# Patient Record
Sex: FEMALE | Race: WHITE | Employment: OTHER | URBAN - METROPOLITAN AREA
[De-identification: names, ages, dates, MRNs, and addresses within clinical notes are randomized per-mention and may not be internally consistent; named-entity substitution may affect disease eponyms.]

---

## 2015-07-01 LAB — PAP SMEAR, EXTERNAL: NORMAL

## 2017-09-21 LAB
COLONOSCOPY, EXTERNAL: NORMAL
MAMMOGRAPHY, EXTERNAL: NORMAL

## 2018-10-11 LAB — AMB DEXA, EXTERNAL: NORMAL

## 2020-05-28 LAB — CREATININE, EXTERNAL: 0.82

## 2020-10-19 VITALS
WEIGHT: 129 LBS | RESPIRATION RATE: 12 BRPM | SYSTOLIC BLOOD PRESSURE: 146 MMHG | OXYGEN SATURATION: 97 % | TEMPERATURE: 98.3 F | BODY MASS INDEX: 24.35 KG/M2 | DIASTOLIC BLOOD PRESSURE: 68 MMHG | HEART RATE: 73 BPM | HEIGHT: 61 IN

## 2020-10-19 VITALS
BODY MASS INDEX: 24.35 KG/M2 | HEART RATE: 73 BPM | SYSTOLIC BLOOD PRESSURE: 146 MMHG | DIASTOLIC BLOOD PRESSURE: 68 MMHG | WEIGHT: 129 LBS | HEIGHT: 61 IN | OXYGEN SATURATION: 97 %

## 2020-10-19 RX ORDER — REPAGLINIDE 1 MG/1
TABLET ORAL
COMMUNITY
Start: 2020-09-30 | End: 2021-04-28 | Stop reason: ALTCHOICE

## 2020-10-19 RX ORDER — TACROLIMUS 1 MG/G
OINTMENT TOPICAL
COMMUNITY
Start: 2020-07-16 | End: 2021-01-07

## 2020-10-19 RX ORDER — METFORMIN HYDROCHLORIDE 500 MG/1
500 TABLET ORAL DAILY
COMMUNITY
Start: 2006-10-04

## 2020-10-19 RX ORDER — CALCIUM CARBONATE/VITAMIN D3 500-10/5ML
400 LIQUID (ML) ORAL
COMMUNITY
Start: 2016-04-06

## 2020-10-19 RX ORDER — ROSUVASTATIN CALCIUM 5 MG/1
TABLET, COATED ORAL
COMMUNITY
Start: 2020-07-24 | End: 2022-01-11

## 2020-10-19 RX ORDER — LORATADINE 10 MG/1
10 TABLET ORAL DAILY
COMMUNITY
Start: 2020-07-15

## 2020-10-19 RX ORDER — PEN NEEDLE, DIABETIC 32GX 5/32"
NEEDLE, DISPOSABLE MISCELLANEOUS
COMMUNITY
Start: 2020-09-09

## 2020-10-19 RX ORDER — CALCIUM CARBONATE 750 MG/1
TABLET, CHEWABLE ORAL
COMMUNITY
Start: 2016-04-13 | End: 2021-01-07

## 2020-10-19 RX ORDER — ASCORBIC ACID 125 MG
TABLET,CHEWABLE ORAL
COMMUNITY
Start: 2016-04-06 | End: 2022-01-11

## 2020-10-19 RX ORDER — LEVOTHYROXINE SODIUM 75 UG/1
75 TABLET ORAL DAILY
COMMUNITY
Start: 2016-04-06

## 2020-10-19 RX ORDER — YEAST,DRIED (S. CEREVISIAE)
POWDER (GRAM) ORAL
COMMUNITY
End: 2022-01-11

## 2020-10-19 RX ORDER — INSULIN GLARGINE 300 U/ML
INJECTION, SOLUTION SUBCUTANEOUS
COMMUNITY

## 2020-10-19 RX ORDER — LIDOCAINE AND PRILOCAINE 25; 25 MG/G; MG/G
CREAM TOPICAL
COMMUNITY
Start: 2020-10-16

## 2020-11-12 VITALS
SYSTOLIC BLOOD PRESSURE: 146 MMHG | DIASTOLIC BLOOD PRESSURE: 68 MMHG | OXYGEN SATURATION: 97 % | RESPIRATION RATE: 12 BRPM | WEIGHT: 129.25 LBS | HEIGHT: 61 IN | HEART RATE: 73 BPM | TEMPERATURE: 98.3 F | BODY MASS INDEX: 24.4 KG/M2

## 2020-11-12 PROBLEM — M85.80 OSTEOPENIA: Status: ACTIVE | Noted: 2020-11-12

## 2020-11-12 PROBLEM — I10 HYPERTENSIVE DISORDER: Status: ACTIVE | Noted: 2020-11-12

## 2020-11-12 RX ORDER — AZELASTINE HCL 205.5 UG/1
SPRAY NASAL 2 TIMES DAILY
COMMUNITY

## 2020-11-12 RX ORDER — BISOPROLOL FUMARATE AND HYDROCHLOROTHIAZIDE 5; 6.25 MG/1; MG/1
1 TABLET ORAL DAILY
COMMUNITY

## 2020-11-12 RX ORDER — GABAPENTIN 300 MG/1
300 CAPSULE ORAL
COMMUNITY

## 2020-11-12 RX ORDER — EXENATIDE 2 MG/.85ML
INJECTION, SUSPENSION, EXTENDED RELEASE SUBCUTANEOUS
COMMUNITY

## 2020-11-12 RX ORDER — DIAZEPAM 5 MG/1
5 TABLET ORAL
COMMUNITY
End: 2021-01-07

## 2020-11-12 RX ORDER — REPAGLINIDE 0.5 MG/1
0.5 TABLET ORAL
COMMUNITY

## 2020-11-12 RX ORDER — ZINC CITRATE/PHYTASE 25MG-500MG
CAPSULE ORAL
COMMUNITY

## 2020-11-12 RX ORDER — BLOOD SUGAR DIAGNOSTIC
STRIP MISCELLANEOUS SEE ADMIN INSTRUCTIONS
COMMUNITY

## 2020-11-12 RX ORDER — AMLODIPINE AND OLMESARTAN MEDOXOMIL 5; 20 MG/1; MG/1
TABLET ORAL
COMMUNITY

## 2020-11-12 RX ORDER — CHOLECALCIFEROL (VITAMIN D3) 125 MCG
CAPSULE ORAL
COMMUNITY

## 2020-11-12 RX ORDER — ERGOCALCIFEROL 1.25 MG/1
50000 CAPSULE ORAL
COMMUNITY

## 2021-01-07 ENCOUNTER — OFFICE VISIT (OUTPATIENT)
Dept: FAMILY MEDICINE CLINIC | Age: 81
End: 2021-01-07
Payer: MEDICARE

## 2021-01-07 VITALS
HEART RATE: 68 BPM | TEMPERATURE: 96.9 F | WEIGHT: 135.38 LBS | HEIGHT: 61 IN | DIASTOLIC BLOOD PRESSURE: 60 MMHG | SYSTOLIC BLOOD PRESSURE: 110 MMHG | OXYGEN SATURATION: 97 % | BODY MASS INDEX: 25.56 KG/M2

## 2021-01-07 DIAGNOSIS — Z79.4 CONTROLLED TYPE 2 DIABETES MELLITUS WITH HYPERGLYCEMIA, WITH LONG-TERM CURRENT USE OF INSULIN (HCC): ICD-10-CM

## 2021-01-07 DIAGNOSIS — Z91.81 AT LOW RISK FOR FALL: ICD-10-CM

## 2021-01-07 DIAGNOSIS — I10 ESSENTIAL HYPERTENSION: ICD-10-CM

## 2021-01-07 DIAGNOSIS — Z00.00 MEDICARE ANNUAL WELLNESS VISIT, SUBSEQUENT: Primary | ICD-10-CM

## 2021-01-07 DIAGNOSIS — Z13.39 ALCOHOL SCREENING: ICD-10-CM

## 2021-01-07 DIAGNOSIS — Z13.31 DEPRESSION SCREENING: ICD-10-CM

## 2021-01-07 DIAGNOSIS — S90.222A TOENAIL BRUISE, LEFT, INITIAL ENCOUNTER: ICD-10-CM

## 2021-01-07 DIAGNOSIS — E66.3 OVERWEIGHT WITH BODY MASS INDEX (BMI) OF 26 TO 26.9 IN ADULT: ICD-10-CM

## 2021-01-07 DIAGNOSIS — M85.80 OSTEOPENIA, UNSPECIFIED LOCATION: ICD-10-CM

## 2021-01-07 DIAGNOSIS — E11.65 CONTROLLED TYPE 2 DIABETES MELLITUS WITH HYPERGLYCEMIA, WITH LONG-TERM CURRENT USE OF INSULIN (HCC): ICD-10-CM

## 2021-01-07 PROCEDURE — G8752 SYS BP LESS 140: HCPCS | Performed by: NURSE PRACTITIONER

## 2021-01-07 PROCEDURE — 99213 OFFICE O/P EST LOW 20 MIN: CPT | Performed by: NURSE PRACTITIONER

## 2021-01-07 PROCEDURE — 1090F PRES/ABSN URINE INCON ASSESS: CPT | Performed by: NURSE PRACTITIONER

## 2021-01-07 PROCEDURE — G8432 DEP SCR NOT DOC, RNG: HCPCS | Performed by: NURSE PRACTITIONER

## 2021-01-07 PROCEDURE — G8419 CALC BMI OUT NRM PARAM NOF/U: HCPCS | Performed by: NURSE PRACTITIONER

## 2021-01-07 PROCEDURE — G0439 PPPS, SUBSEQ VISIT: HCPCS | Performed by: NURSE PRACTITIONER

## 2021-01-07 PROCEDURE — 1101F PT FALLS ASSESS-DOCD LE1/YR: CPT | Performed by: NURSE PRACTITIONER

## 2021-01-07 PROCEDURE — G8536 NO DOC ELDER MAL SCRN: HCPCS | Performed by: NURSE PRACTITIONER

## 2021-01-07 PROCEDURE — G8427 DOCREV CUR MEDS BY ELIG CLIN: HCPCS | Performed by: NURSE PRACTITIONER

## 2021-01-07 PROCEDURE — G8754 DIAS BP LESS 90: HCPCS | Performed by: NURSE PRACTITIONER

## 2021-01-07 NOTE — PATIENT INSTRUCTIONS
Medicare Wellness Visit, Female The best way to live healthy is to have a lifestyle where you eat a well-balanced diet, exercise regularly, limit alcohol use, and quit all forms of tobacco/nicotine, if applicable. Regular preventive services are another way to keep healthy. Preventive services (vaccines, screening tests, monitoring & exams) can help personalize your care plan, which helps you manage your own care. Screening tests can find health problems at the earliest stages, when they are easiest to treat. [facility] follows the current, evidence-based guidelines published by the [facility] ([facility]) when recommending preventive services for our patients. Because we follow these guidelines, sometimes recommendations change over time as research supports it. (For example, mammograms used to be recommended annually. Even though Medicare will still pay for an annual mammogram, the newer guidelines recommend a mammogram every two years for women of average risk). Of course, you and your doctor may decide to screen more often for some diseases, based on your risk and your co-morbidities (chronic disease you are already diagnosed with). Preventive services for you include: - Medicare offers their members a free annual wellness visit, which is time for you and your primary care provider to discuss and plan for your preventive service needs. Take advantage of this benefit every year! 
-All adults over the age of 72 should receive the recommended pneumonia vaccines. Current USPSTF guidelines recommend a series of two vaccines for the best pneumonia protection.  
-All adults should have a flu vaccine yearly and a tetanus vaccine every 10 years.  
-All adults age 48 and older should receive the shingles vaccines (series of two vaccines). -All adults age 38-68 who are overweight should have a diabetes screening test once every three years. -All adults born between [birth year] and 1967 should be screened once for Hepatitis C. 
-Other screening tests and preventive services for persons with diabetes include: an eye exam to screen for diabetic retinopathy, a kidney function test, a foot exam, and stricter control over your cholesterol.  
-Cardiovascular screening for adults with routine risk involves an electrocardiogram (ECG) at intervals determined by your doctor.  
-Colorectal cancer screenings should be done for adults age 54-65 with no increased risk factors for colorectal cancer. There are a number of acceptable methods of screening for this type of cancer. Each test has its own benefits and drawbacks. Discuss with your doctor what is most appropriate for you during your annual wellness visit. The different tests include: colonoscopy (considered the best screening method), a fecal occult blood test, a fecal DNA test, and sigmoidoscopy. 
 
-A bone mass density test is recommended when a woman turns 65 to screen for osteoporosis. This test is only recommended one time, as a screening. Some providers will use this same test as a disease monitoring tool if you already have osteoporosis. -Breast cancer screenings are recommended every other year for women of normal risk, age 54-69. 
-Cervical cancer screenings for women over age 72 are only recommended with certain risk factors. Here is a list of your current Health Maintenance items (your personalized list of preventive services) with a due date: 
Health Maintenance Due Topic Date Due  
 Diabetic Foot Care  09/01/1950  Albumin Urine Test  09/01/1950  Cholesterol Test   09/01/1950

## 2021-01-07 NOTE — PROGRESS NOTES
Medicare Wellness Exam:    Chief Complaint   Patient presents with    Annual Wellness Visit     she is a 78y.o. year old female who presents for evaluation for their Medicare Wellness Visit. Patient presents for Medicare wellness and has an acute complaint today. Follows with cardiology and endocrinology for management of her chronic conditions, medications, and labs. Patient presents with complaint of toenail discoloration following stubbed toe several weeks ago. Evaluation to date: none  Treatment to date: none  Relevant PMH: No pertinent additional PMH. Fall Screen is completed and assessed=yes  Depression Screen is completed and assessed=yes  Medication list reviewed and adjusted for accuracy=yes  Immunizations reviewed and updated=yes  Health/Preventative Screenings reviewed and updated=yes  ADL Functions reviewed=yes  MiniCog score= 5/5 (2points for clock, 3/3 for recall)  See scanned medicare wellness documents for full details. Patient Active Problem List    Diagnosis    Hypertensive disorder    Osteopenia    Hyperlipidemia    Diabetes mellitus type 2, controlled ([facility] [address]    Sleep apnea    Neuropathy    Vitamin D deficiency    Osteoarthritis    Idiopathic scoliosis of lumbar spine    Family history of breast cancer    Primary malignant neoplasm of female breast ([facility] [address]       Reviewed PmHx, RxHx, FmHx, SocHx, AllgHx and updated and dated in the chart. Review of Systems   Constitutional: Negative for chills, fever and weight loss. HENT: Negative for congestion, ear pain, hearing loss, sinus pain and sore throat. Denies difficulty swallowing. Eyes: Negative for blurred vision. Respiratory: Negative for cough, shortness of breath and wheezing. Cardiovascular: Negative for chest pain, palpitations, claudication and leg swelling. Gastrointestinal: Positive for constipation. Negative for abdominal pain, diarrhea and heartburn.    Genitourinary: Negative for dysuria. Musculoskeletal: Positive for joint pain. Negative for myalgias. Neurological: Positive for tingling (bilateral foot neuropathy). Negative for dizziness, weakness and headaches. Psychiatric/Behavioral: Negative for depression. The patient is not nervous/anxious. Objective:     Vitals:    01/07/21 1359   BP: 110/60   Pulse: 68   Temp: 96.9 °F (36.1 °C)   TempSrc: Temporal   SpO2: 97%   Weight: 135 lb 6 oz (61.4 kg)   Height: 5' 0.5\" (1.537 m)     Physical Exam  Vitals signs and nursing note reviewed. Constitutional:       General: She is not in acute distress. Appearance: Normal appearance. HENT:      Head: Normocephalic. Eyes:      Extraocular Movements: Extraocular movements intact. Neck:      Musculoskeletal: Neck supple. Thyroid: No thyroid mass, thyromegaly or thyroid tenderness. Cardiovascular:      Rate and Rhythm: Normal rate and regular rhythm. Heart sounds: Normal heart sounds. Pulmonary:      Effort: Pulmonary effort is normal.      Breath sounds: Normal breath sounds. Musculoskeletal: Normal range of motion. Right lower leg: No edema. Left lower leg: No edema. Feet:    Lymphadenopathy:      Cervical: No cervical adenopathy. Upper Body:      Right upper body: No supraclavicular adenopathy. Left upper body: No supraclavicular adenopathy. Skin:     General: Skin is warm and dry. Neurological:      Mental Status: She is alert and oriented to person, place, and time. Psychiatric:         Mood and Affect: Mood normal.         Behavior: Behavior normal.          Assessment/ Plan:   Diagnoses and all orders for this visit:    1. Medicare annual wellness visit, subsequent  [facility] exam completed as documented. 2. Depression screening  Negative depression screening. 3. Alcohol screening  Low risk for alcohol misuse. 4. At low risk for fall  Low fall risk.     5. Overweight with body mass index (BMI) of 26 to 26.9 in adult  Continue to stay active and eat a healthy diet. 6. Osteopenia, unspecified location  Overdue for 2-year repeat screening.  -     DEXA BONE DENSITY STUDY AXIAL; Future    7. Essential hypertension  Managed by cardiology. 8. Controlled type 2 diabetes mellitus with hyperglycemia, with long-term current use of insulin ([address]  Managed by endocrinology. 9. Toenail bruise, left, initial encounter  Toenail shows evidence of trauma from stubbed toe several months ago. Explained she is likely going to lose this nail. Okay to soak in Epson salt if needed for discomfort. Call for any ongoing concerns.       -Pain evaluation performed in office  -Cognitive Screen performed in office  -Depression Screen, Fall risks (by up and go test)  and ADL functionality were addressed  -Medication list updated and reviewed for any changes   -A comprehensive review of medical issues and a plan was formulated  -End of life planning was addressed with pt   -Health Screenings for preventions were addressed and a plan was formulated  -Shingles Vaccine was recommended  -Discussed with patient cancer risk factors and appropriate screenings for age  -Patient evaluated for colonoscopy and referred if needed per screeing criteria  -Labs from previous visits were discussed with patient   -Discussed with patient diet and exercise and formulated a plan as needed  -An Advanced care plan was developed with the patient.  -Alcohol screening performed and was negative    -  Follow-up and Dispositions    · Return in about 1 year (around 1/7/2022) for [facility], nonfasting. I have discussed the diagnosis with the patient and the intended plan as seen in the above orders. The patient understands and agrees with the plan. The patient has received an after-visit summary and questions were answered concerning future plans.      Medication Side Effects and Warnings were discussed with patien  Patient Labs were reviewed and or requested  Patient Past Records were reviewed and or requested        Fernando CHRISTIE-NASIR

## 2021-04-28 ENCOUNTER — OFFICE VISIT (OUTPATIENT)
Dept: FAMILY MEDICINE CLINIC | Age: 81
End: 2021-04-28
Payer: MEDICARE

## 2021-04-28 VITALS
WEIGHT: 130 LBS | HEART RATE: 68 BPM | BODY MASS INDEX: 24.55 KG/M2 | TEMPERATURE: 97.3 F | DIASTOLIC BLOOD PRESSURE: 62 MMHG | OXYGEN SATURATION: 99 % | SYSTOLIC BLOOD PRESSURE: 110 MMHG | HEIGHT: 61 IN

## 2021-04-28 DIAGNOSIS — R09.81 SINUS CONGESTION: ICD-10-CM

## 2021-04-28 DIAGNOSIS — H61.23 BILATERAL IMPACTED CERUMEN: ICD-10-CM

## 2021-04-28 DIAGNOSIS — H92.03 OTALGIA OF BOTH EARS: Primary | ICD-10-CM

## 2021-04-28 PROCEDURE — G8427 DOCREV CUR MEDS BY ELIG CLIN: HCPCS | Performed by: FAMILY MEDICINE

## 2021-04-28 PROCEDURE — 1101F PT FALLS ASSESS-DOCD LE1/YR: CPT | Performed by: FAMILY MEDICINE

## 2021-04-28 PROCEDURE — G8432 DEP SCR NOT DOC, RNG: HCPCS | Performed by: FAMILY MEDICINE

## 2021-04-28 PROCEDURE — G8752 SYS BP LESS 140: HCPCS | Performed by: FAMILY MEDICINE

## 2021-04-28 PROCEDURE — G8399 PT W/DXA RESULTS DOCUMENT: HCPCS | Performed by: FAMILY MEDICINE

## 2021-04-28 PROCEDURE — 1090F PRES/ABSN URINE INCON ASSESS: CPT | Performed by: FAMILY MEDICINE

## 2021-04-28 PROCEDURE — G8754 DIAS BP LESS 90: HCPCS | Performed by: FAMILY MEDICINE

## 2021-04-28 PROCEDURE — G8420 CALC BMI NORM PARAMETERS: HCPCS | Performed by: FAMILY MEDICINE

## 2021-04-28 PROCEDURE — 99213 OFFICE O/P EST LOW 20 MIN: CPT | Performed by: FAMILY MEDICINE

## 2021-04-28 PROCEDURE — G8536 NO DOC ELDER MAL SCRN: HCPCS | Performed by: FAMILY MEDICINE

## 2021-04-28 PROCEDURE — 69209 REMOVE IMPACTED EAR WAX UNI: CPT | Performed by: FAMILY MEDICINE

## 2021-04-28 NOTE — PROGRESS NOTES
Chief Complaint   Patient presents with    Other     Patient states that she has been having sinus, left ear pain and left jaw pain with dizziness ongoing for months, also states she has fatigue and arthritis joint pain ongoing for months    1. Have you been to the ER, urgent care clinic since your last visit? Hospitalized since your last visit? No    2. Have you seen or consulted any other health care providers outside of the [address] [place] [address] since your last visit? Include any pap smears or colon screening.  Yes, patient has seen endocrinologist and neurologist , dr. Clem Denson and Dr. Chaitanya Staples  for routine check up

## 2021-04-28 NOTE — PROGRESS NOTES
Subjective  Chief Complaint   Patient presents with    Other     Patient states that she has been having sinus, left ear pain and left jaw pain with dizziness ongoing for months, also states she has fatigue and arthritis joint pain ongoing for months      HPI:  Dangelo Pierce is a 78 y.o. female. Somewhere along the line I started having pains in the left ear and jaw. \"  Occasionally in the right side. These pains are intermittent and fleeting. Maxillary sinuses hurt and eating sets off sinus drainage and sneezing. She gets lightheadedness with this as well. No fevers. No eye itching, draining, or pain. Objective  Visit Vitals  /62 (BP 1 Location: Left upper arm, BP Patient Position: At rest, BP Cuff Size: Adult)   Pulse 68   Temp 97.3 °F (36.3 °C) (Temporal)   Ht 5' 0.5\" (1.537 m)   Wt 130 lb (59 kg)   SpO2 99%   BMI 24.97 kg/m²     Physical Exam  Constitutional:       General: She is not in acute distress. Appearance: Normal appearance. She is normal weight. HENT:      Head: Normocephalic and atraumatic. Right Ear: External ear normal. There is impacted cerumen. Left Ear: External ear normal. There is impacted cerumen. Nose: Rhinorrhea present. No nasal tenderness, mucosal edema or congestion. Eyes:      General: Lids are normal.         Right eye: No discharge. Left eye: No discharge. Conjunctiva/sclera:      Right eye: Right conjunctiva is not injected. No exudate. Left eye: Left conjunctiva is not injected. No exudate. Neck:      Musculoskeletal: Neck supple. No muscular tenderness. Thyroid: No thyroid mass or thyromegaly. Cardiovascular:      Rate and Rhythm: Normal rate and regular rhythm. Heart sounds: No murmur. Pulmonary:      Effort: Pulmonary effort is normal. No respiratory distress. Breath sounds: Normal breath sounds. No wheezing. Musculoskeletal:      Right lower leg: No edema. Left lower leg: No edema. Lymphadenopathy:      Cervical: No cervical adenopathy. Skin:     General: Skin is warm and dry. Neurological:      Mental Status: She is alert and oriented to person, place, and time. Mental status is at baseline. Psychiatric:         Attention and Perception: Attention and perception normal.         Mood and Affect: Mood and affect normal.         Speech: Speech normal.         Behavior: Behavior normal.     Procedure Note:  After informed consent was discussed and obtained, under direct visualization, cerumen was removed from the ear canal(s) which was obstructing the visualization of the tympanic membrane. Using peroxide flushing and cerumen curettes in areas needed, the cerumen was completely removed. The external canal was healthy upon completion of the procedure without any bruising or laceration. Assessment & Plan      ICD-10-CM ICD-9-CM    1. Otalgia of both ears  H92.03 388.70    2. Sinus congestion  R09.81 478.19    3. Bilateral impacted cerumen  H61.23 380.4 REMOVE IMPACTED EAR WAX     Diagnoses and all orders for this visit:    1. Otalgia of both ears  Bilateral ears flushed in office today. Hopefully this alone will help the patient feel much better but I am also having her start a long-acting antihistamine such as Zyrtec in case of any allergy related etiology. 2. Sinus congestion  Patient story would fit with a pollen related exacerbation of allergic rhinitis. We will have her continue the Flonase and start a long-acting antihistamine such as Zyrtec. She can use Coricidin as needed. 3. Bilateral impacted cerumen  -     REMOVE IMPACTED EAR WAX  Successfully flushed in office today as noted above. Patient tolerated procedure.       Carolina Carey MD

## 2022-01-11 ENCOUNTER — OFFICE VISIT (OUTPATIENT)
Dept: FAMILY MEDICINE CLINIC | Age: 82
End: 2022-01-11
Payer: MEDICARE

## 2022-01-11 VITALS
OXYGEN SATURATION: 98 % | HEART RATE: 77 BPM | RESPIRATION RATE: 16 BRPM | TEMPERATURE: 97.1 F | BODY MASS INDEX: 24.02 KG/M2 | HEIGHT: 61 IN | DIASTOLIC BLOOD PRESSURE: 66 MMHG | SYSTOLIC BLOOD PRESSURE: 142 MMHG | WEIGHT: 127.25 LBS

## 2022-01-11 DIAGNOSIS — Z13.31 DEPRESSION SCREENING: ICD-10-CM

## 2022-01-11 DIAGNOSIS — Z00.00 MEDICARE ANNUAL WELLNESS VISIT, SUBSEQUENT: Primary | ICD-10-CM

## 2022-01-11 DIAGNOSIS — Z91.81 AT LOW RISK FOR FALL: ICD-10-CM

## 2022-01-11 DIAGNOSIS — I10 PRIMARY HYPERTENSION: ICD-10-CM

## 2022-01-11 DIAGNOSIS — Z13.39 ALCOHOL SCREENING: ICD-10-CM

## 2022-01-11 DIAGNOSIS — Z71.89 ADVANCED DIRECTIVES, COUNSELING/DISCUSSION: ICD-10-CM

## 2022-01-11 DIAGNOSIS — Z23 ENCOUNTER FOR IMMUNIZATION: ICD-10-CM

## 2022-01-11 DIAGNOSIS — E11.65 CONTROLLED TYPE 2 DIABETES MELLITUS WITH HYPERGLYCEMIA, WITH LONG-TERM CURRENT USE OF INSULIN (HCC): ICD-10-CM

## 2022-01-11 DIAGNOSIS — Z79.4 CONTROLLED TYPE 2 DIABETES MELLITUS WITH HYPERGLYCEMIA, WITH LONG-TERM CURRENT USE OF INSULIN (HCC): ICD-10-CM

## 2022-01-11 PROCEDURE — G8536 NO DOC ELDER MAL SCRN: HCPCS | Performed by: NURSE PRACTITIONER

## 2022-01-11 PROCEDURE — G8399 PT W/DXA RESULTS DOCUMENT: HCPCS | Performed by: NURSE PRACTITIONER

## 2022-01-11 PROCEDURE — G8420 CALC BMI NORM PARAMETERS: HCPCS | Performed by: NURSE PRACTITIONER

## 2022-01-11 PROCEDURE — G8753 SYS BP > OR = 140: HCPCS | Performed by: NURSE PRACTITIONER

## 2022-01-11 PROCEDURE — G8427 DOCREV CUR MEDS BY ELIG CLIN: HCPCS | Performed by: NURSE PRACTITIONER

## 2022-01-11 PROCEDURE — G8432 DEP SCR NOT DOC, RNG: HCPCS | Performed by: NURSE PRACTITIONER

## 2022-01-11 PROCEDURE — G0439 PPPS, SUBSEQ VISIT: HCPCS | Performed by: NURSE PRACTITIONER

## 2022-01-11 PROCEDURE — 1101F PT FALLS ASSESS-DOCD LE1/YR: CPT | Performed by: NURSE PRACTITIONER

## 2022-01-11 PROCEDURE — G8754 DIAS BP LESS 90: HCPCS | Performed by: NURSE PRACTITIONER

## 2022-01-11 NOTE — PROGRESS NOTES
Medicare Wellness Exam:    Chief Complaint   Patient presents with    Annual Wellness Visit     she is a 78y.o. year old female who presents for evaluation for their Medicare Wellness Visit. Patient presents for Medicare wellness. Follows with cardiology and endocrinology for management of chronic conditions, medications, and labs. Reported home BP readings 130s/60s. Fall Screen is completed and assessed=yes  Depression Screen is completed and assessed=yes  Medication list reviewed and adjusted for accuracy=yes  Immunizations reviewed and updated=yes  Health/Preventative Screenings reviewed and updated=yes  ADL Functions reviewed=yes  MiniCog score= 5/5 (2points for clock, 3/3 for recall)  See scanned medicare wellness documents for full details. Patient Active Problem List    Diagnosis    History of breast cancer    Hypertensive disorder    Osteopenia    Hyperlipidemia     Managed by Dr. Linda Mcdaniels Diabetes mellitus type 2, controlled ([facility] [address]     Managed by Dr. Ria Lyles Sleep apnea    Neuropathy    Vitamin D deficiency    Osteoarthritis    Idiopathic scoliosis of lumbar spine    Family history of breast cancer       Reviewed PmHx, RxHx, FmHx, SocHx, AllgHx and updated and dated in the chart. ROS   See HPI for pertinent ROS. Objective:     Vitals:    01/11/22 1318   BP: (!) 142/66   Pulse: 77   Resp: 16   Temp: 97.1 °F (36.2 °C)   SpO2: 98%   Weight: 127 lb 4 oz (57.7 kg)   Height: 5' 0.5\" (1.537 m)     Physical Exam  Vitals and nursing note reviewed. Constitutional:       General: She is not in acute distress. Appearance: Normal appearance. She is normal weight. HENT:      Head: Normocephalic. Eyes:      Extraocular Movements: Extraocular movements intact. Neck:      Thyroid: No thyroid mass, thyromegaly or thyroid tenderness. Cardiovascular:      Rate and Rhythm: Normal rate and regular rhythm. Heart sounds: Murmur heard.        Pulmonary:      Effort: Pulmonary effort is normal.      Breath sounds: Normal breath sounds. Chest:   Breasts:      Right: No supraclavicular adenopathy. Left: No supraclavicular adenopathy. Musculoskeletal:         General: Normal range of motion. Cervical back: Neck supple. Right lower leg: No edema. Left lower leg: No edema. Lymphadenopathy:      Cervical: No cervical adenopathy. Upper Body:      Right upper body: No supraclavicular adenopathy. Left upper body: No supraclavicular adenopathy. Skin:     General: Skin is warm and dry. Neurological:      Mental Status: She is alert and oriented to person, place, and time. Psychiatric:         Mood and Affect: Mood normal.         Behavior: Behavior normal.          Assessment/ Plan:   Diagnoses and all orders for this visit:    1. Medicare annual wellness visit, subsequent  [facility] exam completed as documented. 2. Depression screening  Negative depression screening. 3. Alcohol screening  Low risk for alcohol misuse. 4. At low risk for fall  Low fall risk. 5. Advanced directives, counseling/discussion  Discussed the purpose and importance of advanced directives and encouraged to complete at his earliest convenience. Once complete, provide a copy to us for medical record. 6. BMI 24.0-24.9, adult  Continue to stay active and eat a healthy diet. 7. Encounter for immunization  Will verify date of COVID booster with pharmacy/[facility]. 8. Controlled type 2 diabetes mellitus with hyperglycemia, with long-term current use of insulin ([facility] [address]  Follows with endocrinology for management. 5. Primary hypertension  Follows with cardiology for management.   BP is above goal in the office today but below goal on reported home readings.       -Pain evaluation performed in office  -Cognitive Screen performed in office  -Depression Screen, Fall risks (by up and go test)  and ADL functionality were addressed  -Medication list updated and reviewed for any changes   -A comprehensive review of medical issues and a plan was formulated  -End of life planning was addressed with pt   -Health Screenings for preventions were addressed and a plan was formulated  -Shingles Vaccine was recommended  -Discussed with patient cancer risk factors and appropriate screenings for age  -Patient evaluated for colonoscopy and referred if needed per screeing criteria  -Labs from previous visits were discussed with patient   -Discussed with patient diet and exercise and formulated a plan as needed  -An Advanced care plan was developed with the patient.  -Alcohol screening performed and was negative    -  Follow-up and Dispositions    · Return in about 1 year (around 1/11/2023) for [facility], nonfasting. I have discussed the diagnosis with the patient and the intended plan as seen in the above orders. The patient understands and agrees with the plan. The patient has received an after-visit summary and questions were answered concerning future plans.      Medication Side Effects and Warnings were discussed with patien  Patient Labs were reviewed and or requested  Patient Past Records were reviewed and or requested        [facility] FNP-C

## 2022-01-11 NOTE — PATIENT INSTRUCTIONS
The best way to stay healthy is to live a healthy lifestyle. A healthy lifestyle includes regular exercise, eating a well-balanced diet, keeping a healthy weight and not smoking. Regular physical exams and screening tests are another important way to take care of yourself. Preventive exams provided by health care providers can find health problems early when treatment works best and can keep you from getting certain diseases or illnesses. Preventive services include exams, lab tests, screenings, shots, monitoring and information to help you take care of your own health. All people over 65 should have a pneumonia shot. Pneumonia shots are usually only needed once in a lifetime unless your doctor decides differently. In addition to your physical exam, some screening tests are recommended:    All people over 65 should have a yearly flu shot. People over 65 are at medium to high risk for Hepatitis B. Three shots are needed for complete protection. Bone mass measurement (dexa scan) is recommended every two years. Diabetes Mellitus screening is recommended every year. Glaucoma is an eye disease caused by high pressure in the eye. An eye exam is recommended every year. Cardiovascular screening tests that check your cholesterol and other blood fat (lipid) levels are recommended every five years. Colorectal Cancer screening tests help to find pre-cancerous polyps (growths in the colon) so they can be removed before they turn into cancer. Tests ordered for screening depend on your personal and family history risk factors. Prostate Cancer Screening (annually up to age 76)    Screening for breast cancer is recommended yearly with a Mammogram.    Screening for cervical and vaginal cancer is recommended with a pelvic and Pap test every two years.  However if you have had an abnormal pap in the past  three years or at high risk for cervical or vaginal cancer Medicare will cover a pap test and a pelvic exam every year.      Here is a list of your current Health Maintenance items with a due date:  Health Maintenance Due   Topic Date Due    COVID-19 Vaccine (3 - Booster for [employer] series) 08/22/2021

## 2022-01-11 NOTE — PROGRESS NOTES
Chief Complaint   Patient presents with   [facility] Annual Wellness Visit   1. Have you been to the ER, urgent care clinic since your last visit? Hospitalized since your last visit? No    2. Have you seen or consulted any other health care providers outside of the [address] [place] [address] since your last visit? Include any pap smears or colon screening.  No   3 most recent PHQ Screens 1/11/2022   Little interest or pleasure in doing things Not at all   Feeling down, depressed, irritable, or hopeless Not at all   Total Score PHQ 2 0     Visit Vitals  BP (!) 142/66 (BP 1 Location: Right upper arm, BP Patient Position: Sitting)   Pulse 77   Temp 97.1 °F (36.2 °C)   Resp 16   Ht 5' 0.5\" (1.537 m)   Wt 127 lb 4 oz (57.7 kg)   SpO2 98%   BMI 24.44 kg/m²

## 2022-02-10 LAB
CREATININE, EXTERNAL: 0.69
HBA1C MFR BLD HPLC: 6.6 %
LDL-C, EXTERNAL: 147

## 2022-03-19 PROBLEM — M85.80 OSTEOPENIA: Status: ACTIVE | Noted: 2020-11-12

## 2022-03-19 PROBLEM — I10 HYPERTENSIVE DISORDER: Status: ACTIVE | Noted: 2020-11-12

## 2022-05-05 ENCOUNTER — VIRTUAL VISIT (OUTPATIENT)
Dept: FAMILY MEDICINE CLINIC | Age: 82
End: 2022-05-05
Payer: MEDICARE

## 2022-05-05 DIAGNOSIS — R10.13 EPIGASTRIC PAIN: Primary | ICD-10-CM

## 2022-05-05 PROCEDURE — G8399 PT W/DXA RESULTS DOCUMENT: HCPCS | Performed by: FAMILY MEDICINE

## 2022-05-05 PROCEDURE — G8427 DOCREV CUR MEDS BY ELIG CLIN: HCPCS | Performed by: FAMILY MEDICINE

## 2022-05-05 PROCEDURE — 1101F PT FALLS ASSESS-DOCD LE1/YR: CPT | Performed by: FAMILY MEDICINE

## 2022-05-05 PROCEDURE — 1090F PRES/ABSN URINE INCON ASSESS: CPT | Performed by: FAMILY MEDICINE

## 2022-05-05 PROCEDURE — G8432 DEP SCR NOT DOC, RNG: HCPCS | Performed by: FAMILY MEDICINE

## 2022-05-05 PROCEDURE — G8756 NO BP MEASURE DOC: HCPCS | Performed by: FAMILY MEDICINE

## 2022-05-05 PROCEDURE — 99213 OFFICE O/P EST LOW 20 MIN: CPT | Performed by: FAMILY MEDICINE

## 2022-05-05 RX ORDER — BISOPROLOL FUMARATE 5 MG/1
5 TABLET ORAL DAILY
COMMUNITY

## 2022-05-05 RX ORDER — ALUMINUM ZIRCONIUM OCTACHLOROHYDREX GLY 16 G/100G
1 GEL TOPICAL DAILY
COMMUNITY

## 2022-05-05 RX ORDER — CELECOXIB 100 MG/1
CAPSULE ORAL
COMMUNITY
Start: 2022-04-19

## 2022-05-05 RX ORDER — LEVOMEFOLATE CALCIUM 7.5 MG
1 TABLET ORAL DAILY
COMMUNITY

## 2022-05-05 RX ORDER — ALPHA LIPOIC ACID 600 MG
1 TABLET ORAL DAILY
COMMUNITY

## 2022-05-05 RX ORDER — PHENOL/SODIUM PHENOLATE
AEROSOL, SPRAY (ML) MUCOUS MEMBRANE
Qty: 30 TABLET | Refills: 2 | Status: SHIPPED | OUTPATIENT
Start: 2022-05-05

## 2022-05-05 NOTE — PROGRESS NOTES
Consent:  Amberly Royal, was evaluated through a synchronous (real-time) audio-video encounter. The patient (or guardian if applicable) is aware that this is a billable service, which includes applicable co-pays. This Virtual Visit was conducted with patient's (and/or legal guardian's) consent. The visit was conducted pursuant to the emergency declaration under the [facility] [address] and the [employer] and [employer] General Act. Patient identification was verified, and a caregiver was present when appropriate. The patient was located in a state where the provider was licensed to provide care. 712  Subjective:   Amberly Royal is a 78 y.o. female who was seen for Other (patient is have stomach pain under breast off and on for a while now )    Pt scheduled this appointment today to discuss stomach issue. Stomach pain has been going on and off for the last few months. The pain is epigastric area. She had a colonoscopy about 6 weeks ago for separate lower abd pain. They removed 6 polyps and the lower abd pain that she was having has cleared. The epigastric area pain is worse if she has an empty stomach. pepcid helps but she is reluctant to take it daily without getting advise first.  She does get some nausea but no vomiting. Prior to Admission medications    Medication Sig Start Date End Date Taking? Authorizing Provider   bisoprolol (ZEBETA) 5 mg tablet Take 5 mg by mouth daily. Yes Provider, Historical   celecoxib (CELEBREX) 100 mg capsule take 1 capsule by mouth once daily with food 4/19/22  Yes Provider, Historical   l-methylfolate (L-Methylfolate) 7.5 mg tab Take 1 Tablet by mouth daily. Yes Provider, Historical   alpha lipoic acid 600 mg tab Take 1 Tablet by mouth daily. Yes Provider, Historical   Bifidobacterium Infantis (Align) 4 mg cap Take 1 Capsule by mouth daily.    Yes Provider, Historical Omeprazole delayed release (PRILOSEC D/R) 20 mg tablet One tab po daily x 4 wks then prn 5/5/22  Yes Bj Velazquez MD   amLODIPine-Olmesartan 5-20 mg tab Take  by mouth. 1 tablet po daily   Yes Provider, Historical   azelastine (Astepro) 0.15 % (205.5 mcg) two (2) times a day. Yes Provider, Historical   exenatide microspheres (Bydureon BCise) 2 mg/0.85 mL atIn by SubCUTAneous route. Yes Provider, Historical   glucose blood VI test strips (Contour Next Test Strips) strip by Does Not Apply route See Admin Instructions. Yes Provider, Historical   gabapentin (NEURONTIN) 300 mg capsule Take 300 mg by mouth nightly. Yes Provider, Historical   light mineral oil-min oil, PF, (Retaine MGD, PF,) 0.5-0.5 % dpet Apply  to eye. Yes Provider, Historical   ergocalciferol (Vitamin D2) 1,250 mcg (50,000 unit) capsule Take 50,000 Units by mouth. Yes Provider, Historical   insulin glargine U-300 conc (Toujeo SoloStar U-300 Insulin) 300 unit/mL (1.5 mL) inpn pen Toujeo SoloStar U-300 Insulin   Yes Provider, Historical   levothyroxine (Synthroid) 75 mcg tablet Take 75 mg by mouth daily. 4/6/16  Yes Provider, Historical   lidocaine-prilocaine (EMLA) topical cream  10/16/20  Yes Provider, Historical   magnesium oxide 400 mg magnesium cap 400 mg. 4/6/16  Yes Provider, Historical   metFORMIN (GLUCOPHAGE) 500 mg tablet Take 500 mg by mouth daily. 10/4/06  Yes Provider, Historical   BD Georgiana 2nd Gen Pen Needle 32 gauge x 5/32\" ndle inject as directed daily subcutaneously 9/9/20  Yes Provider, Historical   naproxen sodium (Aleve) 220 mg cap Take  by mouth. Patient not taking: Reported on 5/5/2022    Provider, Historical   bisoprolol-hydroCHLOROthiazide [facility]) 5-6.25 mg per tablet Take 1 Tab by mouth daily. Patient not taking: Reported on 5/5/2022    Provider, Historical   repaglinide (PRANDIN) 0.5 mg tablet Take 0.5 mg by mouth Before breakfast, lunch, and dinner.   Patient not taking: Reported on 5/5/2022    Provider, Historical   loratadine (CLARITIN) 10 mg tablet Take 10 mg by mouth daily. Patient not taking: Reported on 5/5/2022 7/15/20   Provider, Historical     Allergies   Allergen Reactions    Hydrocodone Nausea and Vomiting    Oxycodone Nausea and Vomiting    Percocet [Oxycodone-Acetaminophen] Nausea and Vomiting     Patient Active Problem List    Diagnosis    History of breast cancer    Hypertensive disorder    Osteopenia    Hyperlipidemia     Managed by Dr. Sindhu Larson Diabetes mellitus type 2, controlled ([facility] [address]     Managed by Dr. Nargis Roberts Sleep apnea    Neuropathy    Vitamin D deficiency    Osteoarthritis    Idiopathic scoliosis of lumbar spine    Family history of breast cancer       Objective:   Vital Signs: (As obtained by patient/caregiver at home)  There were no vitals taken for this visit.      [INSTRUCTIONS:  \"[x]\" Indicates a positive item  \"[]\" Indicates a negative item  -- DELETE ALL ITEMS NOT EXAMINED]    Constitutional: [x] Appears well-developed and well-nourished [x] No apparent distress      [] Abnormal -     Mental status: [x] Alert and awake  [x] Oriented to person/place/time [x] Able to follow commands    [] Abnormal -     Eyes:   EOM    [x]  Normal    [] Abnormal -   Sclera  [x]  Normal    [] Abnormal -          Discharge [x]  None visible   [] Abnormal -     HENT: [x] Normocephalic, atraumatic  [] Abnormal -   [] Mouth/Throat: Mucous membranes are moist    External Ears [] Normal  [] Abnormal -    Neck: [x] No visualized mass [] Abnormal -     Pulmonary/Chest: [x] Respiratory effort normal   [x] No visualized signs of difficulty breathing or respiratory distress        [] Abnormal -        Neurological:        [x] No Facial Asymmetry (Cranial nerve 7 motor function) (limited exam due to video visit)          [x] No gaze palsy        [] Abnormal -          Skin:        [x] No significant exanthematous lesions or discoloration noted on facial skin         [] Abnormal - Psychiatric:       [x] Normal Affect [] Abnormal -        [x] No Hallucinations    Other pertinent observable physical exam findings:-              Assessment & Plan:   Diagnoses and all orders for this visit:    1. Epigastric pain    Other orders  -     Omeprazole delayed release (PRILOSEC D/R) 20 mg tablet; One tab po daily x 4 wks then prn    reflux vs ulcer are highest on differential.  We are going to treat with PPI x 1 month. If improved then she can go back to as needed PPI or pepcid after that. If not feeling improvement over the next 2-4 weeks then will need to see GI for EGD. She reports understanding. We discussed the expected course, resolution and complications of the diagnosis(es) in detail. Medication risks, benefits, costs, interactions, and alternatives were discussed as indicated. I advised her to contact the office if her condition worsens, changes or fails to improve as anticipated. She expressed understanding with the diagnosis(es) and plan. Sussy Wilson is a 78 y.o. female being evaluated by a video visit encounter for concerns as above. A caregiver was present when appropriate. Due to this being a TeleHealth encounter (During [facility]- public health emergency), evaluation of the following organ systems was limited: Vitals/Constitutional/EENT/Resp/CV/GI//MS/Neuro/Skin/Heme-Lymph-Imm. Pursuant to the emergency declaration under the [facility], 1135 waiver authority and the [employer] and [employer] General Act, this Virtual  Visit was conducted, with patient's (and/or legal guardian's) consent, to reduce the patient's risk of exposure to COVID-19 and provide necessary medical care. Services were provided through a video synchronous discussion virtually to substitute for in-person clinic visit. Patient and provider were located at their individual homes.         Heike Ag MD

## 2022-05-05 NOTE — PROGRESS NOTES
Chief Complaint   Patient presents with    Other     patient is have stomach pain under breast off and on for a while now      1. Have you been to the ER, urgent care clinic since your last visit? Hospitalized since your last visit? No    2. Have you seen or consulted any other health care providers outside of the [address] [place] [address] since your last visit? Include any pap smears or colon screening.  Yes, patient has seen endocrinologist, cardiologist, gastro , pain management, and neurologist       Patient would like to use # [phone] for her visit today

## 2022-11-11 ENCOUNTER — TELEPHONE (OUTPATIENT)
Dept: FAMILY MEDICINE CLINIC | Age: 82
End: 2022-11-11

## 2022-11-11 NOTE — TELEPHONE ENCOUNTER
Care Transitions Initial Follow Up Call    Outreach made within 2 business days of discharge: Yes    Patient: Michelle Ryder Patient : 1940   MRN: [id number]  Reason for Admission: Bowel Blockage   Discharge Date:  11/10/2022       Spoke with: Patient    Discharge department/facility: [facility] Interactive Patient Contact:  Was patient able to fill all prescriptions: Yes  Was patient instructed to bring all medications to the follow-up visit: Yes  Is patient taking all medications as directed in the discharge summary?  Yes  Does patient understand their discharge instructions: Yes  Does patient have questions or concerns that need addressed prior to 7-14 day follow up office visit: No    Scheduled appointment with PCP within 7-14 days    Follow Up  Future Appointments   Date Time Provider Wesley Saxena   2022  2:30 PM Gerardo Calderón MD [facility] BS AMB   2023  1:00 PM Nelly Patel NP [facility]

## 2022-11-14 ENCOUNTER — VIRTUAL VISIT (OUTPATIENT)
Dept: FAMILY MEDICINE CLINIC | Age: 82
End: 2022-11-14
Payer: MEDICARE

## 2022-11-14 DIAGNOSIS — J06.9 UPPER RESPIRATORY TRACT INFECTION, UNSPECIFIED TYPE: ICD-10-CM

## 2022-11-14 DIAGNOSIS — K59.00 CONSTIPATION, UNSPECIFIED CONSTIPATION TYPE: Primary | ICD-10-CM

## 2022-11-14 DIAGNOSIS — R91.1 PULMONARY NODULE, RIGHT: ICD-10-CM

## 2022-11-14 DIAGNOSIS — Z09 HOSPITAL DISCHARGE FOLLOW-UP: ICD-10-CM

## 2022-11-14 PROCEDURE — G8427 DOCREV CUR MEDS BY ELIG CLIN: HCPCS | Performed by: FAMILY MEDICINE

## 2022-11-14 PROCEDURE — 1111F DSCHRG MED/CURRENT MED MERGE: CPT | Performed by: FAMILY MEDICINE

## 2022-11-14 PROCEDURE — 99214 OFFICE O/P EST MOD 30 MIN: CPT | Performed by: FAMILY MEDICINE

## 2022-11-14 RX ORDER — UREA 10 %
1.5 LOTION (ML) TOPICAL
COMMUNITY

## 2022-11-14 RX ORDER — CLOTRIMAZOLE AND BETAMETHASONE DIPROPIONATE 10; .64 MG/G; MG/G
CREAM TOPICAL 2 TIMES DAILY
COMMUNITY

## 2022-11-14 RX ORDER — POLYETHYLENE GLYCOL 3350 17 G/17G
POWDER, FOR SOLUTION ORAL
COMMUNITY
Start: 2022-11-10

## 2022-11-14 RX ORDER — AMLODIPINE AND OLMESARTAN MEDOXOMIL 5; 40 MG/1; MG/1
1 TABLET ORAL DAILY
COMMUNITY
Start: 2022-11-02

## 2022-11-14 NOTE — PROGRESS NOTES
Chief Complaint   Patient presents with    Hospital Follow Up     [facility]     1. \"Have you been to the ER, urgent care clinic since your last visit? Hospitalized since your last visit? \" Yes When: 11/8/22-11/10/22 Where: [facility] Reason for visit: Bowel obstruction    2. \"Have you seen or consulted any other health care providers outside of the [address] [place] [address] since your last visit? \"  Yes she has. 3. For patients aged 39-70: Has the patient had a colonoscopy / FIT/ Cologuard? Yes - Care Gap present. Rooming MA/LPN to request most recent results Dr. Ajay Shore did on last Spring      If the patient is female:    4. For patients aged 41-77: Has the patient had a mammogram within the past 2 years? NA - based on age or sex      11. For patients aged 21-65: Has the patient had a pap smear?  NA - based on age or sex    3 most recent PHQ Screens 11/14/2022   Little interest or pleasure in doing things Not at all   Feeling down, depressed, irritable, or hopeless Not at all   Total Score PHQ 2 [address] [facility] [phone]

## 2022-11-14 NOTE — PROGRESS NOTES
Consent:  Ellen Davis, was evaluated through a synchronous (real-time) audio-video encounter. The patient (or guardian if applicable) is aware that this is a billable service, which includes applicable co-pays. This Virtual Visit was conducted with patient's (and/or legal guardian's) consent. The visit was conducted pursuant to the emergency declaration under the [address] [place] [address] and the [employer] General Act. Patient identification was verified, and a caregiver was present when appropriate. The patient was located in a state where the provider was licensed to provide care. 712  Subjective:   Ellen Davis is a 78 y.o. female who was seen for Hospital Follow Up (Bowel obstruction. [facility] 11/8/22-11/10/2022)      This patient is here for hospital follow up. Discharge summary and transition of care note reviewed today by me. Medications reconciled. Admission dates: 11/8/2022 - 11/10/2022    Discharge diagnosis: Severe constipation    Treatment: Enemas then MiraLAX daily versus every other day pending response    Specialist follow up appointments: gastroenterologist, Dr. Yazmin Sultana. Update: Regarding the bowel movements, she reports that just in the last 24 hours they are starting to become more mucousy and loose. She called the gastroenterology office and they advised skipping MiraLAX today and tomorrow. On 11/11/2022 she started having upper respiratory cold symptoms. They progressed over the last few days but seem to be improving slightly today compared to yesterday. She has a COVID test at home but has not performed it yet. She does report that on one of her imaging studies while inpatient they noted that she had a nodule in her lower lung and she was advised to have a CT scan ordered by me.   This was not on the discharge summary but we were able to pull her CT abdomen pelvis which does report this finding. Since discharge patient denies any fevers, shortness of breath, lower extremity edema. Prior to Admission medications    Medication Sig Start Date End Date Taking? Authorizing Provider   polyethylene glycol (MIRALAX) 17 gram packet MIX 1 packet as directed and take 1 packet daily 11/10/22  Yes Provider, Historical   amLODIPine-Olmesartan 5-40 mg tab Take 1 Tablet by mouth daily. 11/2/22  Yes Provider, Historical   melatonin 1 mg tablet Take 1.5 mg by mouth nightly. Yes Provider, Historical   clotrimazole-betamethasone (LOTRISONE) topical cream Apply  to affected area two (2) times a day. Yes Provider, Historical   bisoprolol (ZEBETA) 5 mg tablet Take 5 mg by mouth daily. Yes Provider, Historical   celecoxib (CELEBREX) 100 mg capsule take 1 capsule by mouth once daily with food 4/19/22  Yes Provider, Historical   l-methylfolate 7.5 mg tab Take 1 Tablet by mouth daily. Yes Provider, Historical   alpha lipoic acid 600 mg tab Take 1 Tablet by mouth daily. Yes Provider, Historical   Bifidobacterium Infantis (Align) 4 mg cap Take 1 Capsule by mouth daily. Yes Provider, Historical   azelastine (ASTEPRO) 205.5 mcg (0.15 %) two (2) times a day. Yes Provider, Historical   exenatide microspheres (Bydureon BCise) 2 mg/0.85 mL atIn by SubCUTAneous route. Yes Provider, Historical   glucose blood VI test strips (Contour Next Test Strips) strip by Does Not Apply route See Admin Instructions. Yes Provider, Historical   gabapentin (NEURONTIN) 300 mg capsule Take 300 mg by mouth nightly. Yes Provider, Historical   light mineral oil-min oil, PF, (Retaine MGD, PF,) 0.5-0.5 % dpet Apply  to eye. Yes Provider, Historical   ergocalciferol (ERGOCALCIFEROL) 1,250 mcg (50,000 unit) capsule Take 50,000 Units by mouth.    Yes Provider, Historical   insulin glargine U-300 conc (Toujeo SoloStar U-300 Insulin) 300 unit/mL (1.5 mL) inpn pen Toujeo SoloStar U-300 Insulin   Yes Provider, Historical levothyroxine (SYNTHROID) 75 mcg tablet Take 75 mg by mouth daily. 4/6/16  Yes Provider, Historical   lidocaine-prilocaine (EMLA) topical cream  10/16/20  Yes Provider, Historical   loratadine (CLARITIN) 10 mg tablet Take 10 mg by mouth daily. 7/15/20  Yes Provider, Historical   magnesium oxide 400 mg magnesium cap 400 mg. 4/6/16  Yes Provider, Historical   metFORMIN (GLUCOPHAGE) 500 mg tablet Take 500 mg by mouth daily. 10/4/06  Yes Provider, Historical   BD Georgiana 2nd Gen Pen Needle 32 gauge x 5/32\" ndle inject as directed daily subcutaneously 9/9/20  Yes Provider, Historical   Omeprazole delayed release (PRILOSEC D/R) 20 mg tablet One tab po daily x 4 wks then prn  Patient not taking: Reported on 11/14/2022 5/5/22   Heather Awad MD   naproxen sodium 220 mg cap Take  by mouth. Patient not taking: Reported on 11/14/2022    Provider, Historical   bisoprolol-hydroCHLOROthiazide [facility]) 5-6.25 mg per tablet Take 1 Tab by mouth daily. Patient not taking: No sig reported    Provider, Historical   repaglinide (PRANDIN) 0.5 mg tablet Take 0.5 mg by mouth Before breakfast, lunch, and dinner. Patient not taking: No sig reported    Provider, Historical   amLODIPine-Olmesartan 5-20 mg tab Take  by mouth. 1 tablet po daily  11/14/22  Provider, Historical     Allergies   Allergen Reactions    Hydrocodone Nausea and Vomiting    Oxycodone Nausea and Vomiting    Percocet [Oxycodone-Acetaminophen] Nausea and Vomiting     Patient Active Problem List    Diagnosis    Pulmonary nodule, right     RLL. 8.9mm on 11/08/22 CT abd/pelvis enlarged from 7.8 mm on 7/15/17 CT.       History of breast cancer    Hypertensive disorder    Osteopenia    Hyperlipidemia     Managed by Dr. David Rodriguez      Diabetes mellitus type 2, controlled ([facility] [address]     Managed by Dr. Bethany Crockett      Sleep apnea    Neuropathy    Vitamin D deficiency    Osteoarthritis    Idiopathic scoliosis of lumbar spine    Family history of breast cancer       Objective:   Vital Signs: (As obtained by patient/caregiver at home)  There were no vitals taken for this visit. [INSTRUCTIONS:  \"[x]\" Indicates a positive item  \"[]\" Indicates a negative item  -- DELETE ALL ITEMS NOT EXAMINED]    Constitutional: [x] Appears well-developed and well-nourished [x] No apparent distress      [] Abnormal -     Mental status: [x] Alert and awake  [x] Oriented to person/place/time [x] Able to follow commands    [] Abnormal -     Eyes:   EOM    [x]  Normal    [] Abnormal -   Sclera  [x]  Normal    [] Abnormal -          Discharge [x]  None visible   [] Abnormal -     HENT: [x] Normocephalic, atraumatic  [] Abnormal -   [] Mouth/Throat: Mucous membranes are moist    External Ears [] Normal  [] Abnormal -    Neck: [x] No visualized mass [] Abnormal -     Pulmonary/Chest: [x] Respiratory effort normal   [x] No visualized signs of difficulty breathing or respiratory distress        [] Abnormal -        Neurological:        [x] No Facial Asymmetry (Cranial nerve 7 motor function) (limited exam due to video visit)          [x] No gaze palsy        [] Abnormal -          Skin:        [x] No significant exanthematous lesions or discoloration noted on facial skin         [] Abnormal -            Psychiatric:       [x] Normal Affect [] Abnormal -        [x] No Hallucinations    Other pertinent observable physical exam findings:-              Assessment & Plan:   Diagnoses and all orders for this visit:    1. Constipation, unspecified constipation type  I agree with holding MiraLAX while stools have become too loose. She should keep her follow-up with gastroenterology as scheduled. When she does restart the MiraLAX she can do every other day or half capful daily instead of a full capful. 2. Pulmonary nodule, right  Assessment & Plan:   RLL. 8.9mm on 11/08/22 CT abd/pelvis enlarged from 7.8 mm on 7/15/17 CT. I am ordering the CT of the chest as recommended by the radiologist with [facility].     Orders:  -     CT CHEST W CONT; Future    3. Upper respiratory tract infection, unspecified type  I am advising her to do the home COVID swab. If it is positive for COVID we have until Wednesday to start Paxil bid if she would like. It is also an option to simply treat with rest, fluids, and over-the-counter medications. We discussed the expected course, resolution and complications of the diagnosis(es) in detail. Medication risks, benefits, costs, interactions, and alternatives were discussed as indicated. I advised her to contact the office if her condition worsens, changes or fails to improve as anticipated. She expressed understanding with the diagnosis(es) and plan. Bonifacio Patel is a 78 y.o. female being evaluated by a video visit encounter for concerns as above. A caregiver was present when appropriate. Due to this being a TeleHealth encounter ([facility]), evaluation of the following organ systems was limited: Vitals/Constitutional/EENT/Resp/CV/GI//MS/Neuro/Skin/Heme-Lymph-Imm. Pursuant to the emergency declaration under the [facility], [facility]5 waiver authority and the [employer] Act, this Virtual  Visit was conducted, with patient's (and/or legal guardian's) consent, to reduce the patient's risk of exposure to COVID-19 and provide necessary medical care. Services were provided through a video synchronous discussion virtually to substitute for in-person clinic visit. Patient and provider were located at their individual homes. Aspects of this note have been generated using voice recognition software. Despite editing, there may be some syntax errors.     Antwon Suazo MD

## 2022-12-08 LAB — HBA1C MFR BLD HPLC: 7.1 %

## 2022-12-13 ENCOUNTER — HOSPITAL ENCOUNTER (OUTPATIENT)
Dept: CT IMAGING | Age: 82
Discharge: HOME | End: 2022-12-13
Payer: MEDICARE

## 2022-12-13 DIAGNOSIS — R91.1 PULMONARY NODULE, RIGHT: ICD-10-CM

## 2022-12-13 LAB — CREAT BLD-MCNC: 0.8 MG/DL (ref 0.6–1.3)

## 2022-12-13 PROCEDURE — 71260 CT THORAX DX C+: CPT

## 2022-12-13 PROCEDURE — 74011000636 HC RX REV CODE- 636: Performed by: FAMILY MEDICINE

## 2022-12-13 RX ADMIN — IOPAMIDOL 100 ML: 612 INJECTION, SOLUTION INTRAVENOUS at 13:34

## 2022-12-14 DIAGNOSIS — R91.1 PULMONARY NODULE, RIGHT: Primary | ICD-10-CM

## 2022-12-16 NOTE — PROGRESS NOTES
Patient notified of results , and states that she already see [facility] and sleep clinics at the [facility] and will see them for this as well.

## 2023-01-12 ENCOUNTER — OFFICE VISIT (OUTPATIENT)
Dept: FAMILY MEDICINE CLINIC | Age: 83
End: 2023-01-12
Payer: MEDICARE

## 2023-01-12 VITALS
OXYGEN SATURATION: 97 % | DIASTOLIC BLOOD PRESSURE: 82 MMHG | HEART RATE: 78 BPM | SYSTOLIC BLOOD PRESSURE: 136 MMHG | TEMPERATURE: 97.3 F | RESPIRATION RATE: 16 BRPM | BODY MASS INDEX: 24.17 KG/M2 | HEIGHT: 61 IN | WEIGHT: 128 LBS

## 2023-01-12 DIAGNOSIS — Z91.81 AT LOW RISK FOR FALL: ICD-10-CM

## 2023-01-12 DIAGNOSIS — Z00.00 MEDICARE ANNUAL WELLNESS VISIT, SUBSEQUENT: Primary | ICD-10-CM

## 2023-01-12 DIAGNOSIS — Z71.89 ADVANCED DIRECTIVES, COUNSELING/DISCUSSION: ICD-10-CM

## 2023-01-12 DIAGNOSIS — Z13.39 ALCOHOL SCREENING: ICD-10-CM

## 2023-01-12 DIAGNOSIS — Z13.31 POSITIVE DEPRESSION SCREENING: ICD-10-CM

## 2023-01-12 DIAGNOSIS — E11.65 CONTROLLED TYPE 2 DIABETES MELLITUS WITH HYPERGLYCEMIA, WITH LONG-TERM CURRENT USE OF INSULIN (HCC): ICD-10-CM

## 2023-01-12 DIAGNOSIS — Z79.4 CONTROLLED TYPE 2 DIABETES MELLITUS WITH HYPERGLYCEMIA, WITH LONG-TERM CURRENT USE OF INSULIN (HCC): ICD-10-CM

## 2023-01-12 NOTE — PATIENT INSTRUCTIONS
The best way to stay healthy is to live a healthy lifestyle. A healthy lifestyle includes regular exercise, eating a well-balanced diet, keeping a healthy weight and not smoking. Regular physical exams and screening tests are another important way to take care of yourself. Preventive exams provided by health care providers can find health problems early when treatment works best and can keep you from getting certain diseases or illnesses. Preventive services include exams, lab tests, screenings, shots, monitoring and information to help you take care of your own health. All people over 65 should have a pneumonia shot. Pneumonia shots are usually only needed once in a lifetime unless your doctor decides differently. In addition to your physical exam, some screening tests are recommended:    All people over 65 should have a yearly flu shot. People over 65 are at medium to high risk for Hepatitis B. Three shots are needed for complete protection. Bone mass measurement (dexa scan) is recommended every two years. Diabetes Mellitus screening is recommended every year. Glaucoma is an eye disease caused by high pressure in the eye. An eye exam is recommended every year. Cardiovascular screening tests that check your cholesterol and other blood fat (lipid) levels are recommended every five years. Colorectal Cancer screening tests help to find pre-cancerous polyps (growths in the colon) so they can be removed before they turn into cancer. Tests ordered for screening depend on your personal and family history risk factors. Prostate Cancer Screening (annually up to age 76)    Screening for breast cancer is recommended yearly with a Mammogram.    Screening for cervical and vaginal cancer is recommended with a pelvic and Pap test every two years.  However if you have had an abnormal pap in the past  three years or at high risk for cervical or vaginal cancer Medicare will cover a pap test and a pelvic exam every year.      Here is a list of your current Health Maintenance items with a due date:  Health Maintenance Due   Topic Date Due    Eye Exam  07/29/2022    Diabetic Foot Care  11/12/2022    Annual Well Visit  01/12/2023

## 2023-01-12 NOTE — PROGRESS NOTES
Chief Complaint   Patient presents with    Annual Wellness Visit    1. \"Have you been to the ER, urgent care clinic since your last visit? Hospitalized since your last visit? \" No    2. \"Have you seen or consulted any other health care providers outside of the [address] [place] [address] since your last visit? \" No     3. For patients aged 39-70: Has the patient had a colonoscopy / FIT/ Cologuard? Yes - no Care Gap present      If the patient is female:    4. For patients aged 41-77: Has the patient had a mammogram within the past 2 years? NA - based on age or sex      11. For patients aged 21-65: Has the patient had a pap smear?  NA - based on age or sex Visit Vitals  /82   Pulse 78   Temp 97.3 °F (36.3 °C) (Temporal)   Resp 16   Ht 5' 0.5\" (1.537 m)   Wt 128 lb (58.1 kg)   SpO2 97%   BMI 24.59 kg/m²    [phone] (home)

## 2023-01-12 NOTE — PROGRESS NOTES
Subjective  Chief Complaint   Patient presents with    Annual Wellness Visit     HPI:  Celena Vazquez is a 78 y.o. female. Patient presents for Medicare wellness. Follows with cardiology and endocrinology for management of chronic conditions, medications, and labs. Follows with pulmonary for monitoring of pulmonary nodules. Repeat CT screening scheduled for June. Interested in counseling for depression. Prefers not to take any more medicine. Follows with GI following hospitalization last year for bowel obstruction. Follows with neurology for neuropathy.        Past Medical History:   Diagnosis Date    Breast cancer ([facility] [address]     Decreased hearing of right ear     Diabetes mellitus type 2, controlled ([facility] [address]     High cholesterol     History of bone density study 07/06/2020    Hyperlipidemia     Hypertension     Ischemic colitis ([facility] [address]     Neuropathy     Osteoarthritis     Sleep apnea     Vitamin D deficiency      Family History   Problem Relation Age of Onset    Breast Cancer Mother     Heart Attack Father     Breast Cancer Sister     Other Brother         poliomyelomalacia    Breast Cancer Maternal Aunt     Breast Cancer Paternal Aunt      Social History     Socioeconomic History    Marital status:      Spouse name: Not on file    Number of children: Not on file    Years of education: Not on file    Highest education level: Not on file   Occupational History    Not on file   Tobacco Use    Smoking status: Never    Smokeless tobacco: Never   Vaping Use    Vaping Use: Never used   Substance and Sexual Activity    Alcohol use: Not Currently    Drug use: Never    Sexual activity: Not on file   Other Topics Concern     Service Not Asked    Blood Transfusions Not Asked    Caffeine Concern Not Asked    Occupational Exposure Not Asked    Hobby Hazards Not Asked    Sleep Concern Not Asked    Stress Concern Not Asked    Weight Concern Not Asked    Special Diet Not Asked    Back Care Not Asked    Exercise Not Asked    Bike Helmet Not Asked    Seat Belt Not Asked    Self-Exams Not Asked   Social History Narrative    Not on file     Social Determinants of Health     Financial Resource Strain: Not on file   Food Insecurity: Not on file   Transportation Needs: Not on file   Physical Activity: Not on file   Stress: Not on file   Social Connections: Not on file   Intimate Partner Violence: Not on file   Housing Stability: Not on file     Current Outpatient Medications on File Prior to Visit   Medication Sig Dispense Refill    polyethylene glycol (MIRALAX) 17 gram packet MIX 1 packet as directed and take 1 packet daily      melatonin 1 mg tablet Take 1.5 mg by mouth nightly. clotrimazole-betamethasone (LOTRISONE) topical cream Apply  to affected area two (2) times a day. celecoxib (CELEBREX) 100 mg capsule take 1 capsule by mouth once daily with food      l-methylfolate 7.5 mg tab Take 1 Tablet by mouth daily. alpha lipoic acid 600 mg tab Take 1 Tablet by mouth daily. Bifidobacterium Infantis (Align) 4 mg cap Take 1 Capsule by mouth daily. azelastine (ASTEPRO) 205.5 mcg (0.15 %) two (2) times a day. exenatide microspheres (Bydureon BCise) 2 mg/0.85 mL atIn by SubCUTAneous route. glucose blood VI test strips (Contour Next Test Strips) strip by Does Not Apply route See Admin Instructions. gabapentin (NEURONTIN) 300 mg capsule Take 300 mg by mouth nightly. light mineral oil-min oil, PF, (Retaine MGD, PF,) 0.5-0.5 % dpet Apply  to eye.      ergocalciferol (ERGOCALCIFEROL) 1,250 mcg (50,000 unit) capsule Take 50,000 Units by mouth. insulin glargine U-300 conc (Toujeo SoloStar U-300 Insulin) 300 unit/mL (1.5 mL) inpn pen Toujeo SoloStar U-300 Insulin      levothyroxine (SYNTHROID) 75 mcg tablet Take 75 mg by mouth daily. loratadine (CLARITIN) 10 mg tablet Take 10 mg by mouth daily.       magnesium oxide 400 mg magnesium cap 400 mg.      metFORMIN (GLUCOPHAGE) 500 mg tablet Take 500 mg by mouth daily. BD Georgiana 2nd Gen Pen Needle 32 gauge x 5/32\" ndle inject as directed daily subcutaneously      amLODIPine-Olmesartan 5-40 mg tab Take 1 Tablet by mouth daily. bisoprolol (ZEBETA) 5 mg tablet Take 5 mg by mouth daily. [DISCONTINUED] Omeprazole delayed release (PRILOSEC D/R) 20 mg tablet One tab po daily x 4 wks then prn (Patient not taking: No sig reported) 30 Tablet 2    [DISCONTINUED] naproxen sodium 220 mg cap Take  by mouth. (Patient not taking: No sig reported)      [DISCONTINUED] bisoprolol-hydroCHLOROthiazide (ZIAC) 5-6.25 mg per tablet Take 1 Tab by mouth daily. (Patient not taking: No sig reported)      [DISCONTINUED] repaglinide (PRANDIN) 0.5 mg tablet Take 0.5 mg by mouth Before breakfast, lunch, and dinner. (Patient not taking: No sig reported)      [DISCONTINUED] lidocaine-prilocaine (EMLA) topical cream  (Patient not taking: Reported on 1/12/2023)       No current facility-administered medications on file prior to visit. Allergies   Allergen Reactions    Hydrocodone Nausea and Vomiting    Oxycodone Nausea and Vomiting    Percocet [Oxycodone-Acetaminophen] Nausea and Vomiting         Objective  Visit Vitals  /82   Pulse 78   Temp 97.3 °F (36.3 °C) (Temporal)   Resp 16   Ht 5' 0.5\" (1.537 m)   Wt 128 lb (58.1 kg)   SpO2 97%   BMI 24.59 kg/m²       Physical Exam  Vitals and nursing note reviewed. Constitutional:       General: She is not in acute distress. Appearance: Normal appearance. HENT:      Head: Normocephalic. Eyes:      Extraocular Movements: Extraocular movements intact. Cardiovascular:      Rate and Rhythm: Normal rate and regular rhythm. Heart sounds: Murmur heard. Pulmonary:      Effort: Pulmonary effort is normal.      Breath sounds: Normal breath sounds. Musculoskeletal:         General: Normal range of motion. Right lower leg: No edema. Left lower leg: No edema. Skin:     General: Skin is warm and dry. Neurological:      Mental Status: She is alert and oriented to person, place, and time. Psychiatric:         Mood and Affect: Mood normal.         Behavior: Behavior normal.        Assessment & Plan      ICD-10-CM ICD-9-CM    1. Medicare annual wellness visit, subsequent  Z00.00 V70.0       2. Positive depression screening  Z13.31 796.4       3. Alcohol screening  Z13.39 V79.1       4. At low risk for fall  Z91.81 V49.89       5. Advanced directives, counseling/discussion  Z71.89 V65.49       6. BMI 24.0-24.9, adult  Z68.24 V85.1       7. Controlled type 2 diabetes mellitus with hyperglycemia, with long-term current use of insulin ([facility])  E11.65 250.80     Z79.4 790.29      V58.67         Diagnoses and all orders for this visit:    1. Medicare annual wellness visit, subsequent  W exam completed as documented. 2. Positive depression screening  Declines medication management. Encouraged to schedule with counselor. 3. Alcohol screening  Low risk for alcohol misuse. 4. At low risk for fall  Low fall risk. 5. Advanced directives, counseling/discussion  Discussed the purpose and importance of advanced directives and encouraged to complete at his earliest convenience. Once complete, provide a copy to us for medical record. 6. BMI 24.0-24.9, adult  Weight remains stable. Continue to stay active and eat a healthy diet. 7. Controlled type 2 diabetes mellitus with hyperglycemia, with long-term current use of insulin ([facility] [address]  Assessment & Plan:   monitored by specialist. No acute findings meriting change in the plan        Aspects of this note may have been generated using voice recognition software. Despite editing, there may be some syntax errors. Follow-up and Dispositions    Return in about 1 year (around 1/12/2024) for Michael Zhou, nonfasting. I have discussed the diagnosis with the patient and the intended plan as seen in the above orders.  The patient has received an after-visit summary and questions were answered concerning future plans. I have discussed medication side effects and warnings with the patient as well.     Doris Varghese NP

## 2023-05-29 RX ORDER — AZELASTINE HCL 205.5 UG/1
SPRAY NASAL 2 TIMES DAILY
COMMUNITY

## 2023-05-29 RX ORDER — INSULIN GLARGINE 300 U/ML
INJECTION, SOLUTION SUBCUTANEOUS
COMMUNITY

## 2023-05-29 RX ORDER — EXENATIDE 2 MG/.85ML
INJECTION, SUSPENSION, EXTENDED RELEASE SUBCUTANEOUS
COMMUNITY

## 2023-05-29 RX ORDER — POLYETHYLENE GLYCOL 3350 17 G/17G
POWDER, FOR SOLUTION ORAL
COMMUNITY
Start: 2022-11-10

## 2023-05-29 RX ORDER — UREA 10 %
1.5 LOTION (ML) TOPICAL
COMMUNITY

## 2023-05-29 RX ORDER — ALUMINUM ZIRCONIUM OCTACHLOROHYDREX GLY 16 G/100G
1 GEL TOPICAL DAILY
COMMUNITY

## 2023-05-29 RX ORDER — GABAPENTIN 300 MG/1
300 CAPSULE ORAL NIGHTLY
COMMUNITY

## 2023-05-29 RX ORDER — CALCIUM CARBONATE/VITAMIN D3 500-10/5ML
400 LIQUID (ML) ORAL
COMMUNITY
Start: 2016-04-06

## 2023-05-29 RX ORDER — LEVOTHYROXINE SODIUM 0.07 MG/1
75 TABLET ORAL DAILY
COMMUNITY
Start: 2016-04-06

## 2023-05-29 RX ORDER — ERGOCALCIFEROL 1.25 MG/1
50000 CAPSULE ORAL
COMMUNITY

## 2023-05-29 RX ORDER — AMLODIPINE AND OLMESARTAN MEDOXOMIL 5; 40 MG/1; MG/1
1 TABLET ORAL DAILY
COMMUNITY
Start: 2022-11-02

## 2023-05-29 RX ORDER — LORATADINE 10 MG/1
10 TABLET ORAL DAILY
COMMUNITY
Start: 2020-07-15

## 2023-05-29 RX ORDER — CELECOXIB 100 MG/1
CAPSULE ORAL
COMMUNITY
Start: 2022-04-19

## 2023-05-29 RX ORDER — BISOPROLOL FUMARATE 5 MG/1
5 TABLET, FILM COATED ORAL DAILY
COMMUNITY

## 2023-05-29 RX ORDER — CLOTRIMAZOLE AND BETAMETHASONE DIPROPIONATE 10; .64 MG/G; MG/G
CREAM TOPICAL 2 TIMES DAILY
COMMUNITY

## 2024-01-16 ENCOUNTER — OFFICE VISIT (OUTPATIENT)
Facility: CLINIC | Age: 84
End: 2024-01-16
Payer: MEDICARE

## 2024-01-16 VITALS
TEMPERATURE: 97.5 F | DIASTOLIC BLOOD PRESSURE: 62 MMHG | RESPIRATION RATE: 16 BRPM | OXYGEN SATURATION: 97 % | BODY MASS INDEX: 23.84 KG/M2 | SYSTOLIC BLOOD PRESSURE: 132 MMHG | WEIGHT: 126.25 LBS | HEIGHT: 61 IN | HEART RATE: 78 BPM

## 2024-01-16 DIAGNOSIS — Z00.00 MEDICARE ANNUAL WELLNESS VISIT, SUBSEQUENT: Primary | ICD-10-CM

## 2024-01-16 DIAGNOSIS — Z13.31 POSITIVE DEPRESSION SCREENING: ICD-10-CM

## 2024-01-16 PROCEDURE — G0439 PPPS, SUBSEQ VISIT: HCPCS | Performed by: NURSE PRACTITIONER

## 2024-01-16 PROCEDURE — 1123F ACP DISCUSS/DSCN MKR DOCD: CPT | Performed by: NURSE PRACTITIONER

## 2024-01-16 PROCEDURE — 3078F DIAST BP <80 MM HG: CPT | Performed by: NURSE PRACTITIONER

## 2024-01-16 PROCEDURE — 3075F SYST BP GE 130 - 139MM HG: CPT | Performed by: NURSE PRACTITIONER

## 2024-01-16 RX ORDER — MELOXICAM 7.5 MG/1
7.5 TABLET ORAL DAILY
COMMUNITY
End: 2024-01-16

## 2024-01-16 RX ORDER — CALCIUM CARBONATE 300MG(750)
400 TABLET,CHEWABLE ORAL DAILY
COMMUNITY

## 2024-01-16 ASSESSMENT — PATIENT HEALTH QUESTIONNAIRE - PHQ9
8. MOVING OR SPEAKING SO SLOWLY THAT OTHER PEOPLE COULD HAVE NOTICED. OR THE OPPOSITE, BEING SO FIGETY OR RESTLESS THAT YOU HAVE BEEN MOVING AROUND A LOT MORE THAN USUAL: 0
6. FEELING BAD ABOUT YOURSELF - OR THAT YOU ARE A FAILURE OR HAVE LET YOURSELF OR YOUR FAMILY DOWN: 0
3. TROUBLE FALLING OR STAYING ASLEEP: 2
4. FEELING TIRED OR HAVING LITTLE ENERGY: 1
2. FEELING DOWN, DEPRESSED OR HOPELESS: 3
SUM OF ALL RESPONSES TO PHQ QUESTIONS 1-9: 9
SUM OF ALL RESPONSES TO PHQ QUESTIONS 1-9: 9
10. IF YOU CHECKED OFF ANY PROBLEMS, HOW DIFFICULT HAVE THESE PROBLEMS MADE IT FOR YOU TO DO YOUR WORK, TAKE CARE OF THINGS AT HOME, OR GET ALONG WITH OTHER PEOPLE: 0
5. POOR APPETITE OR OVEREATING: 0
7. TROUBLE CONCENTRATING ON THINGS, SUCH AS READING THE NEWSPAPER OR WATCHING TELEVISION: 0
1. LITTLE INTEREST OR PLEASURE IN DOING THINGS: 3
SUM OF ALL RESPONSES TO PHQ QUESTIONS 1-9: 9
SUM OF ALL RESPONSES TO PHQ QUESTIONS 1-9: 9
SUM OF ALL RESPONSES TO PHQ9 QUESTIONS 1 & 2: 6
9. THOUGHTS THAT YOU WOULD BE BETTER OFF DEAD, OR OF HURTING YOURSELF: 0

## 2024-01-16 ASSESSMENT — LIFESTYLE VARIABLES
HOW MANY STANDARD DRINKS CONTAINING ALCOHOL DO YOU HAVE ON A TYPICAL DAY: PATIENT DOES NOT DRINK
HOW OFTEN DO YOU HAVE A DRINK CONTAINING ALCOHOL: NEVER

## 2024-01-16 NOTE — PROGRESS NOTES
Medicare Annual Wellness Visit    Anita Sr is here for Medicare AWV    Assessment & Plan   Medicare annual wellness visit, subsequent    Body mass index (BMI) of 24.0-24.9 in adult  Continue to stay active and eat a healthy diet.    Positive depression screening  Declines medication management.  List provided for local psychology providers you may be available for counseling.    Recommendations for Preventive Services Due: see orders and patient instructions/AVS.  Recommended screening schedule for the next 5-10 years is provided to the patient in written form: see Patient Instructions/AVS.     Return in about 1 year (around 1/16/2025) for AWV, nonfasting.     Subjective   Patient presents for Medicare wellness.  Follows with cardiology and endocrinology for management of chronic conditions, medications, and labs.   Follows with pulmonary for monitoring of pulmonary nodules annually. Last visit 6/2023.  Follows with cardiology for management of HTN  Follows with endocrinology for management of T2DM and hypothyroidism.   Follows with GI for management of h/o bowel obstruction.  Follows with neurology for neuropathy- Gabapentin.       Patient's complete Health Risk Assessment and screening values have been reviewed and are found in Flowsheets. The following problems were reviewed today and where indicated follow up appointments were made and/or referrals ordered.    Positive Risk Factor Screenings with Interventions:    Fall Risk:  Do you feel unsteady or are you worried about falling? : (!) yes  2 or more falls in past year?: no  Fall with injury in past year?: no     Interventions:    Patient comments: receives PT twice per week for balance per neurology  Reviewed medications, home hazards, visual acuity, and co-morbidities that can increase risk for falls     Depression:  PHQ-2 Score: 6  PHQ-9 Total Score: 9    Interpretation:  5-9 mild   10-14 moderate   15-19 moderately severe   20-27 severe

## 2024-01-16 NOTE — PROGRESS NOTES
Chief Complaint   Patient presents with    Medicare AWV    1. Have you been to the ER, urgent care clinic since your last visit?  Hospitalized since your last visit?No    2. Have you seen or consulted any other health care providers outside of the [facility] since your last visit?   No     3. For patients aged 45-75: Has the patient had a colonoscopy / FIT/ Cologuard? NA - based on age/sex    If the patient is female:    4. For patients aged 40-74: Has the patient had a mammogram within the past 2 years?  NA - based on age/sex      5. For patients aged 21-65: Has the patient had a pap smear?  NA - based on age/sexBP 132/62 (Site: Left Upper Arm, Position: Sitting, Cuff Size: Medium Adult)   Pulse 78   Temp 97.5 °F (36.4 °C) (Temporal)   Resp 16   Ht 1.537 m (5' 0.5\")   Wt 57.3 kg (126 lb 4 oz)   SpO2 97%   BMI 24.25 kg/m²  No data recorded

## 2024-01-26 LAB — HBA1C MFR BLD HPLC: 7.1 %

## 2024-04-16 ENCOUNTER — OFFICE VISIT (OUTPATIENT)
Facility: CLINIC | Age: 84
End: 2024-04-16
Payer: MEDICARE

## 2024-04-16 VITALS
WEIGHT: 127.6 LBS | TEMPERATURE: 97.8 F | BODY MASS INDEX: 24.09 KG/M2 | SYSTOLIC BLOOD PRESSURE: 139 MMHG | HEIGHT: 61 IN | HEART RATE: 76 BPM | OXYGEN SATURATION: 97 % | RESPIRATION RATE: 18 BRPM | DIASTOLIC BLOOD PRESSURE: 70 MMHG

## 2024-04-16 DIAGNOSIS — F41.8 MIXED ANXIETY AND DEPRESSIVE DISORDER: Primary | ICD-10-CM

## 2024-04-16 DIAGNOSIS — E03.9 HYPOTHYROIDISM, UNSPECIFIED TYPE: ICD-10-CM

## 2024-04-16 DIAGNOSIS — E11.65 TYPE 2 DIABETES MELLITUS WITH HYPERGLYCEMIA, WITH LONG-TERM CURRENT USE OF INSULIN (HCC): ICD-10-CM

## 2024-04-16 DIAGNOSIS — Z79.4 TYPE 2 DIABETES MELLITUS WITH HYPERGLYCEMIA, WITH LONG-TERM CURRENT USE OF INSULIN (HCC): ICD-10-CM

## 2024-04-16 PROCEDURE — 3078F DIAST BP <80 MM HG: CPT | Performed by: FAMILY MEDICINE

## 2024-04-16 PROCEDURE — 99214 OFFICE O/P EST MOD 30 MIN: CPT | Performed by: FAMILY MEDICINE

## 2024-04-16 PROCEDURE — 3075F SYST BP GE 130 - 139MM HG: CPT | Performed by: FAMILY MEDICINE

## 2024-04-16 PROCEDURE — 1123F ACP DISCUSS/DSCN MKR DOCD: CPT | Performed by: FAMILY MEDICINE

## 2024-04-16 RX ORDER — SERTRALINE HYDROCHLORIDE 25 MG/1
25 TABLET, FILM COATED ORAL DAILY
Qty: 30 TABLET | Refills: 2 | Status: SHIPPED | OUTPATIENT
Start: 2024-04-16

## 2024-04-16 RX ORDER — PIROXICAM 10 MG/1
10 CAPSULE ORAL DAILY
COMMUNITY

## 2024-04-16 SDOH — ECONOMIC STABILITY: FOOD INSECURITY: WITHIN THE PAST 12 MONTHS, YOU WORRIED THAT YOUR FOOD WOULD RUN OUT BEFORE YOU GOT MONEY TO BUY MORE.: NEVER TRUE

## 2024-04-16 SDOH — ECONOMIC STABILITY: FOOD INSECURITY: WITHIN THE PAST 12 MONTHS, THE FOOD YOU BOUGHT JUST DIDN'T LAST AND YOU DIDN'T HAVE MONEY TO GET MORE.: NEVER TRUE

## 2024-04-16 SDOH — ECONOMIC STABILITY: HOUSING INSECURITY
IN THE LAST 12 MONTHS, WAS THERE A TIME WHEN YOU DID NOT HAVE A STEADY PLACE TO SLEEP OR SLEPT IN A SHELTER (INCLUDING NOW)?: NO

## 2024-04-16 SDOH — ECONOMIC STABILITY: INCOME INSECURITY: HOW HARD IS IT FOR YOU TO PAY FOR THE VERY BASICS LIKE FOOD, HOUSING, MEDICAL CARE, AND HEATING?: NOT HARD AT ALL

## 2024-04-16 ASSESSMENT — PATIENT HEALTH QUESTIONNAIRE - PHQ9
6. FEELING BAD ABOUT YOURSELF - OR THAT YOU ARE A FAILURE OR HAVE LET YOURSELF OR YOUR FAMILY DOWN: NOT AT ALL
3. TROUBLE FALLING OR STAYING ASLEEP: SEVERAL DAYS
9. THOUGHTS THAT YOU WOULD BE BETTER OFF DEAD, OR OF HURTING YOURSELF: NOT AT ALL
5. POOR APPETITE OR OVEREATING: MORE THAN HALF THE DAYS
SUM OF ALL RESPONSES TO PHQ QUESTIONS 1-9: 16
10. IF YOU CHECKED OFF ANY PROBLEMS, HOW DIFFICULT HAVE THESE PROBLEMS MADE IT FOR YOU TO DO YOUR WORK, TAKE CARE OF THINGS AT HOME, OR GET ALONG WITH OTHER PEOPLE: NOT DIFFICULT AT ALL
4. FEELING TIRED OR HAVING LITTLE ENERGY: NEARLY EVERY DAY
7. TROUBLE CONCENTRATING ON THINGS, SUCH AS READING THE NEWSPAPER OR WATCHING TELEVISION: MORE THAN HALF THE DAYS
SUM OF ALL RESPONSES TO PHQ QUESTIONS 1-9: 16
2. FEELING DOWN, DEPRESSED OR HOPELESS: NEARLY EVERY DAY
8. MOVING OR SPEAKING SO SLOWLY THAT OTHER PEOPLE COULD HAVE NOTICED. OR THE OPPOSITE, BEING SO FIGETY OR RESTLESS THAT YOU HAVE BEEN MOVING AROUND A LOT MORE THAN USUAL: MORE THAN HALF THE DAYS
1. LITTLE INTEREST OR PLEASURE IN DOING THINGS: NEARLY EVERY DAY
SUM OF ALL RESPONSES TO PHQ9 QUESTIONS 1 & 2: 6

## 2024-04-16 NOTE — ASSESSMENT & PLAN NOTE
Dr. Huston manages.    Annual ultrasound for \"growths on thyroid\" per patient.    Last done Jan 2024.  No medication changes needed per patient.

## 2024-04-16 NOTE — PROGRESS NOTES
\"Have you been to the ER, urgent care clinic since your last visit?  Hospitalized since your last visit?\"    NO    “Have you seen or consulted any other health care providers outside of [facility] since your last visit?”    NO  Chief Complaint   Patient presents with    Depression      /70 (Site: Right Upper Arm, Position: Sitting, Cuff Size: Medium Adult)   Pulse 76   Temp 97.8 °F (36.6 °C) (Temporal)   Resp 18   Ht 1.537 m (5' 0.5\")   Wt 57.9 kg (127 lb 9.6 oz)   SpO2 97%   BMI 24.51 kg/m²    PHQ-9 Total Score: 16 (2024  8:21 AM)  Thoughts that you would be better off dead, or of hurting yourself in some way: 0 (2024  8:21 AM)         No questionnaires available.                                  Click Here for Release of Records Request     Identified Patient with 2 Patient Identifiers-Name and

## 2024-04-16 NOTE — PROGRESS NOTES
Subjective  Chief Complaint   Patient presents with    Depression     HPI:  Anita Sr is a 83 y.o. female.    Over the last year or so, she has been struggling with feeling down. She also notes more anxieties in her day to day.  She gets nervious doing things she's done for years like cooking, scrapbooking, etc.  The anxiety starts to paralyze her.  She gets tearful when this happens.   Denies SI.   Denies history of issues with moods. Her only medication changes has been switching celebrex to piroxicam this last fall but issues with moods were going on before that.     Past Medical History:   Diagnosis Date    Breast cancer (HCC)     Decreased hearing of right ear     Diabetes mellitus type 2, controlled ([facility])     High cholesterol     History of bone density study 07/06/2020    Hyperlipidemia     Hypertension     Ischemic colitis (HCC)     Neuropathy     Osteoarthritis     Sleep apnea     Vitamin D deficiency      Current Outpatient Medications on File Prior to Visit   Medication Sig Dispense Refill    piroxicam (FELDENE) 10 MG capsule Take 1 capsule by mouth daily      Magnesium 400 MG TABS 400 mg daily      L-METHYLFOLATE PO 1 tablet      Alpha-Lipoic Acid 600 MG TABS Take 1 tablet by mouth daily      amLODIPine-olmesartan (VONNIE) 5-40 MG per tablet Take 1 tablet by mouth daily      azelastine HCl 0.15 % SOLN 2 times daily      bisoprolol (ZEBETA) 5 MG tablet Take 1 tablet by mouth daily      clotrimazole-betamethasone (LOTRISONE) 1-0.05 % cream Apply topically 2 times daily      ergocalciferol (ERGOCALCIFEROL) 1.25 MG (28934 UT) capsule Take 1 capsule by mouth      Exenatide ER (BYDUREON BCISE) 2 MG/0.85ML injection Inject into the skin      gabapentin (NEURONTIN) 300 MG capsule Take 1 capsule by mouth nightly.      insulin glargine, 1 unit dial, (TOUJEO SOLOSTAR) 300 UNIT/ML concentrated injection pen Toujeo SoloStar U-300 Insulin      levothyroxine (SYNTHROID) 75 MCG tablet Take 1,000 tablets by mouth

## 2024-04-18 ENCOUNTER — TELEPHONE (OUTPATIENT)
Facility: CLINIC | Age: 84
End: 2024-04-18

## 2024-04-18 NOTE — TELEPHONE ENCOUNTER
Patient called with concerns about the medication zoloft. Patient's papers from the appointment did not indicate wether to take it in the morning or evening. Patient also wanted to ensure it was filled correctly as she was given a generic zoloft instead. Patient has had a bad history with generic prescriptions and wanted to get clarity from provider.     Requesting call back today (4/18/2024)

## 2024-04-24 LAB — HBA1C MFR BLD HPLC: 6.9 %

## 2024-05-17 DIAGNOSIS — F41.8 MIXED ANXIETY AND DEPRESSIVE DISORDER: ICD-10-CM

## 2024-05-19 RX ORDER — SERTRALINE HYDROCHLORIDE 25 MG/1
25 TABLET, FILM COATED ORAL DAILY
Qty: 90 TABLET | Refills: 2 | Status: SHIPPED | OUTPATIENT
Start: 2024-05-19

## 2024-05-28 ENCOUNTER — OFFICE VISIT (OUTPATIENT)
Facility: CLINIC | Age: 84
End: 2024-05-28
Payer: MEDICARE

## 2024-05-28 VITALS
HEART RATE: 80 BPM | HEIGHT: 61 IN | WEIGHT: 128.2 LBS | TEMPERATURE: 97.8 F | SYSTOLIC BLOOD PRESSURE: 124 MMHG | RESPIRATION RATE: 18 BRPM | OXYGEN SATURATION: 98 % | DIASTOLIC BLOOD PRESSURE: 68 MMHG | BODY MASS INDEX: 24.2 KG/M2

## 2024-05-28 DIAGNOSIS — F41.8 MIXED ANXIETY AND DEPRESSIVE DISORDER: Primary | ICD-10-CM

## 2024-05-28 PROCEDURE — 99214 OFFICE O/P EST MOD 30 MIN: CPT | Performed by: NURSE PRACTITIONER

## 2024-05-28 PROCEDURE — 1123F ACP DISCUSS/DSCN MKR DOCD: CPT | Performed by: NURSE PRACTITIONER

## 2024-05-28 PROCEDURE — 3078F DIAST BP <80 MM HG: CPT | Performed by: NURSE PRACTITIONER

## 2024-05-28 PROCEDURE — 3074F SYST BP LT 130 MM HG: CPT | Performed by: NURSE PRACTITIONER

## 2024-05-28 ASSESSMENT — PATIENT HEALTH QUESTIONNAIRE - PHQ9
SUM OF ALL RESPONSES TO PHQ QUESTIONS 1-9: 0
SUM OF ALL RESPONSES TO PHQ QUESTIONS 1-9: 0
SUM OF ALL RESPONSES TO PHQ9 QUESTIONS 1 & 2: 0
2. FEELING DOWN, DEPRESSED OR HOPELESS: NOT AT ALL
SUM OF ALL RESPONSES TO PHQ QUESTIONS 1-9: 0
1. LITTLE INTEREST OR PLEASURE IN DOING THINGS: NOT AT ALL
SUM OF ALL RESPONSES TO PHQ QUESTIONS 1-9: 0

## 2024-05-28 NOTE — PROGRESS NOTES
\"Have you been to the ER, urgent care clinic since your last visit?  Hospitalized since your last visit?\"    NO    “Have you seen or consulted any other health care providers outside of [facility] since your last visit?”    NO  Chief Complaint   Patient presents with    Follow-up     6 weeks      /68 (Site: Right Upper Arm, Position: Sitting, Cuff Size: Medium Adult)   Pulse 80   Temp 97.8 °F (36.6 °C) (Temporal)   Resp 18   Ht 1.537 m (5' 0.5\")   Wt 58.2 kg (128 lb 3.2 oz)   SpO2 98%   BMI 24.63 kg/m²    PHQ-9 Total Score: 0 (2024  1:21 PM)         No questionnaires available.                                  Click Here for Release of Records Request     Identified Patient with 2 Patient Identifiers-Name and   
packet as directed and take 1 packet daily      Probiotic Product (ACIDOPHILUS PROBIOTIC) CAPS capsule Take 1 capsule by mouth daily       No current facility-administered medications on file prior to visit.     Allergies   Allergen Reactions    Hydrocodone Nausea And Vomiting    Oxycodone Nausea And Vomiting    Oxycodone-Acetaminophen Nausea And Vomiting           Objective  Vitals:    05/28/24 1321   BP: 124/68   Pulse: 80   Resp: 18   Temp: 97.8 °F (36.6 °C)   SpO2: 98%      .FLOWAMB[14   Body mass index is 24.63 kg/m².    Physical Exam  Vitals and nursing note reviewed.   Constitutional:       General: She is not in acute distress.     Appearance: Normal appearance. She is normal weight.   HENT:      Head: Normocephalic.   Eyes:      Extraocular Movements: Extraocular movements intact.   Cardiovascular:      Rate and Rhythm: Normal rate and regular rhythm.      Heart sounds: Normal heart sounds.   Pulmonary:      Effort: Pulmonary effort is normal.      Breath sounds: Normal breath sounds.   Musculoskeletal:         General: Normal range of motion.      Cervical back: Normal range of motion.      Right lower leg: No edema.      Left lower leg: No edema.   Skin:     General: Skin is warm.   Neurological:      General: No focal deficit present.      Mental Status: She is alert and oriented to person, place, and time.   Psychiatric:         Mood and Affect: Mood normal.         Behavior: Behavior normal.         Thought Content: Thought content normal.         Judgment: Judgment normal.           Assessment & Plan    1. Mixed anxiety and depressive disorder  -     sertraline (ZOLOFT) 50 MG tablet; Take 1 tablet by mouth daily, Disp-90 tablet, R-0Normal  Minimal improvement since beginning sertraline.  We discussed that she is started on  low-dose may need more medication to help get her to goal.  Increase dose as ordered.  Reminded it may take several weeks to notice improvement.  Follow-up in 6 weeks.      Aspects

## 2024-07-12 ENCOUNTER — TELEMEDICINE (OUTPATIENT)
Facility: CLINIC | Age: 84
End: 2024-07-12
Payer: MEDICARE

## 2024-07-12 DIAGNOSIS — F41.8 MIXED ANXIETY AND DEPRESSIVE DISORDER: Primary | ICD-10-CM

## 2024-07-12 PROCEDURE — 1123F ACP DISCUSS/DSCN MKR DOCD: CPT | Performed by: NURSE PRACTITIONER

## 2024-07-12 PROCEDURE — 99213 OFFICE O/P EST LOW 20 MIN: CPT | Performed by: NURSE PRACTITIONER

## 2024-07-12 ASSESSMENT — PATIENT HEALTH QUESTIONNAIRE - PHQ9
SUM OF ALL RESPONSES TO PHQ9 QUESTIONS 1 & 2: 0
1. LITTLE INTEREST OR PLEASURE IN DOING THINGS: NOT AT ALL
SUM OF ALL RESPONSES TO PHQ QUESTIONS 1-9: 0
2. FEELING DOWN, DEPRESSED OR HOPELESS: NOT AT ALL

## 2024-07-12 NOTE — PROGRESS NOTES
Chief Complaint   Patient presents with    Follow-up     anxiety     \"Have you been to the ER, urgent care clinic since your last visit?  Hospitalized since your last visit?\"    NO    “Have you seen or consulted any other health care providers outside of [facility] since your last visit?”    NO            Click Here for Release of Records Request   PHQ-9 Total Score: 0 (7/12/2024  2:44 PM)

## 2024-07-12 NOTE — PROGRESS NOTES
Consent: Anita Sr, who was seen by synchronous (real-time) audio-video technology, and/or her healthcare decision maker, is aware that this patient-initiated, Telehealth encounter on 7/12/2024 is a billable service, with coverage as determined by her insurance carrier. She is aware that she may receive a bill and has provided verbal consent to proceed: YES-Consent obtained within past 12 months        712  Subjective:   Anita Sr is a 83 y.o. female who was seen for Follow-up (anxiety)  Presents for management of anxiety and depression. She not crying as much as she was. She is not angry like she was. Overall she is thinking clearer. She is concerned about anxiety about activities that used to not cause anxiety. Examples include driving and cooking. She is also concerned about her lack of motivation. She thinks about what she wants to do but can't find the desire to do anything. She has lost interest in usual activities such as scrapbooking and dining out with spouse.     Prior to Admission medications    Medication Sig Start Date End Date Taking? Authorizing Provider   piroxicam (FELDENE) 10 MG capsule Take 1 capsule by mouth daily   Yes Provider, MD Mini   Magnesium 400 MG TABS 400 mg daily   Yes Provider, MD Mini   L-METHYLFOLATE PO 1 tablet   Yes ProviderMini MD   Alpha-Lipoic Acid 600 MG TABS Take 1 tablet by mouth daily   Yes Automatic Reconciliation, Ar   amLODIPine-olmesartan (VONNIE) 5-40 MG per tablet Take 1 tablet by mouth daily 11/2/22  Yes Automatic Reconciliation, Ar   azelastine HCl 0.15 % SOLN 2 times daily   Yes Automatic Reconciliation, Ar   bisoprolol (ZEBETA) 5 MG tablet Take 1 tablet by mouth daily   Yes Automatic Reconciliation, Ar   clotrimazole-betamethasone (LOTRISONE) 1-0.05 % cream Apply topically 2 times daily   Yes Automatic Reconciliation, Ar   ergocalciferol (ERGOCALCIFEROL) 1.25 MG (61286 UT) capsule Take 1 capsule by mouth   Yes Automatic

## 2024-08-25 DIAGNOSIS — F41.8 MIXED ANXIETY AND DEPRESSIVE DISORDER: ICD-10-CM

## 2025-01-22 ENCOUNTER — TELEPHONE (OUTPATIENT)
Facility: CLINIC | Age: 85
End: 2025-01-22

## 2025-01-22 SDOH — ECONOMIC STABILITY: FOOD INSECURITY: WITHIN THE PAST 12 MONTHS, YOU WORRIED THAT YOUR FOOD WOULD RUN OUT BEFORE YOU GOT MONEY TO BUY MORE.: NEVER TRUE

## 2025-01-22 SDOH — ECONOMIC STABILITY: FOOD INSECURITY: WITHIN THE PAST 12 MONTHS, THE FOOD YOU BOUGHT JUST DIDN'T LAST AND YOU DIDN'T HAVE MONEY TO GET MORE.: NEVER TRUE

## 2025-01-22 SDOH — ECONOMIC STABILITY: INCOME INSECURITY: IN THE LAST 12 MONTHS, WAS THERE A TIME WHEN YOU WERE NOT ABLE TO PAY THE MORTGAGE OR RENT ON TIME?: NO

## 2025-01-22 SDOH — ECONOMIC STABILITY: TRANSPORTATION INSECURITY
IN THE PAST 12 MONTHS, HAS LACK OF TRANSPORTATION KEPT YOU FROM MEETINGS, WORK, OR FROM GETTING THINGS NEEDED FOR DAILY LIVING?: NO

## 2025-01-22 SDOH — HEALTH STABILITY: PHYSICAL HEALTH: ON AVERAGE, HOW MANY MINUTES DO YOU ENGAGE IN EXERCISE AT THIS LEVEL?: 60 MIN

## 2025-01-22 SDOH — HEALTH STABILITY: PHYSICAL HEALTH: ON AVERAGE, HOW MANY DAYS PER WEEK DO YOU ENGAGE IN MODERATE TO STRENUOUS EXERCISE (LIKE A BRISK WALK)?: 2 DAYS

## 2025-01-22 SDOH — ECONOMIC STABILITY: TRANSPORTATION INSECURITY
IN THE PAST 12 MONTHS, HAS THE LACK OF TRANSPORTATION KEPT YOU FROM MEDICAL APPOINTMENTS OR FROM GETTING MEDICATIONS?: NO

## 2025-01-22 ASSESSMENT — PATIENT HEALTH QUESTIONNAIRE - PHQ9
SUM OF ALL RESPONSES TO PHQ QUESTIONS 1-9: 0
SUM OF ALL RESPONSES TO PHQ9 QUESTIONS 1 & 2: 0
1. LITTLE INTEREST OR PLEASURE IN DOING THINGS: NOT AT ALL
SUM OF ALL RESPONSES TO PHQ QUESTIONS 1-9: 0
2. FEELING DOWN, DEPRESSED OR HOPELESS: NOT AT ALL

## 2025-01-22 ASSESSMENT — LIFESTYLE VARIABLES
HOW OFTEN DO YOU HAVE A DRINK CONTAINING ALCOHOL: NEVER
HOW OFTEN DO YOU HAVE SIX OR MORE DRINKS ON ONE OCCASION: 1
HOW OFTEN DO YOU HAVE A DRINK CONTAINING ALCOHOL: 1
HOW MANY STANDARD DRINKS CONTAINING ALCOHOL DO YOU HAVE ON A TYPICAL DAY: PATIENT DOES NOT DRINK
HOW MANY STANDARD DRINKS CONTAINING ALCOHOL DO YOU HAVE ON A TYPICAL DAY: 0

## 2025-01-23 ENCOUNTER — OFFICE VISIT (OUTPATIENT)
Facility: CLINIC | Age: 85
End: 2025-01-23

## 2025-01-23 VITALS
OXYGEN SATURATION: 97 % | BODY MASS INDEX: 22.16 KG/M2 | HEIGHT: 61 IN | HEART RATE: 76 BPM | TEMPERATURE: 97.1 F | WEIGHT: 117.4 LBS | SYSTOLIC BLOOD PRESSURE: 120 MMHG | DIASTOLIC BLOOD PRESSURE: 72 MMHG

## 2025-01-23 DIAGNOSIS — F41.8 MIXED ANXIETY AND DEPRESSIVE DISORDER: ICD-10-CM

## 2025-01-23 DIAGNOSIS — Z00.00 MEDICARE ANNUAL WELLNESS VISIT, SUBSEQUENT: Primary | ICD-10-CM

## 2025-01-23 DIAGNOSIS — E11.65 TYPE 2 DIABETES MELLITUS WITH HYPERGLYCEMIA, WITH LONG-TERM CURRENT USE OF INSULIN (HCC): ICD-10-CM

## 2025-01-23 DIAGNOSIS — Z79.4 TYPE 2 DIABETES MELLITUS WITH HYPERGLYCEMIA, WITH LONG-TERM CURRENT USE OF INSULIN (HCC): ICD-10-CM

## 2025-01-23 RX ORDER — SERTRALINE HYDROCHLORIDE 100 MG/1
100 TABLET, FILM COATED ORAL DAILY
Qty: 90 TABLET | Refills: 0 | Status: SHIPPED | OUTPATIENT
Start: 2025-01-23

## 2025-01-23 NOTE — PROGRESS NOTES
Medicare Annual Wellness Visit    Anita Sr is here for Medicare AWV    Assessment & Plan   Medicare annual wellness visit, subsequent    BMI 22.0-22.9, adult  Continue to stay active.  Eat a healthy diet that is low in sugar, refined carbohydrates, processed foods.    Mixed anxiety and depressive disorder  -     sertraline (ZOLOFT) 100 MG tablet; Take 1 tablet by mouth daily, Disp-90 tablet, R-0Normal  Increase sertraline as ordered.  Call if medication does not improve over the next 4 to 6 weeks, or sooner with any other concerns.    Type 2 diabetes mellitus with hyperglycemia, with long-term current use of insulin ([facility])  Assessment & Plan:   Monitored by specialist- no acute findings meriting change in the plan       Return for AWV, f/u, nonfasting.     Subjective   Presents for Medicare wellness and anxiety management. Medications reviewed, taking as prescribed with no known side effects. Anxiety and depression are at goal with daily medication. However she is concerned about a lack of motivation.     Follows with pulmonary for monitoring of pulmonary nodules annually. Last visit 6/2023.  Follows with cardiology for management of HTN-amlodipine-olmesartan, bisoprolol.  Follows with endocrinology for management of T2DM and hypothyroidism- Exenatide, insulin, levothyroxine, and metformin..   Follows with GI for management of h/o bowel obstruction.  Follows with neurology for neuropathy- Gabapentin.       Patient's complete Health Risk Assessment and screening values have been reviewed and are found in Flowsheets. The following problems were reviewed today and where indicated follow up appointments were made and/or referrals ordered.    Positive Risk Factor Screenings with Interventions:    Fall Risk:  Do you feel unsteady or are you worried about falling? : (!) yes  2 or more falls in past year?: no  Fall with injury in past year?: no     Interventions:    Patient comments: attributed to neuropathy,

## 2025-01-23 NOTE — PROGRESS NOTES
\"Have you been to the ER, urgent care clinic since your last visit?  Hospitalized since your last visit?\"    NO    “Have you seen or consulted any other health care providers outside our system since your last visit?”    NO    PHQ-9 Total Score: 0 (1/22/2025 11:46 AM)

## 2025-04-13 DIAGNOSIS — F41.8 MIXED ANXIETY AND DEPRESSIVE DISORDER: ICD-10-CM

## 2025-04-14 RX ORDER — SERTRALINE HYDROCHLORIDE 100 MG/1
100 TABLET, FILM COATED ORAL DAILY
Qty: 90 TABLET | Refills: 3 | Status: SHIPPED | OUTPATIENT
Start: 2025-04-14

## 2025-04-28 ENCOUNTER — TELEPHONE (OUTPATIENT)
Facility: CLINIC | Age: 85
End: 2025-04-28

## 2025-04-28 NOTE — TELEPHONE ENCOUNTER
----- Message from Jose DENNIS sent at 4/28/2025  2:22 PM EDT -----  Regarding: [facility] Appointment Request  [facility] Appointment Request    Patient needs appointment for [facility] Appointment Type: Existing Condition Follow Up.    Patient Requested Dates(s): ASAP  Patient Requested Time:afternoon   Provider Name:Cat Steward APRN - NP    Reason for Appointment Request: Established Patient - No appointments available during search. Patient need to get the appointment to discuss about her new medication .   --------------------------------------------------------------------------------------------------------------------------    Relationship to Patient: Self     Call Back Information: Leave a Text message   Preferred Call Back Number: Phone [phone]

## 2025-05-01 ENCOUNTER — OFFICE VISIT (OUTPATIENT)
Facility: CLINIC | Age: 85
End: 2025-05-01
Payer: MEDICARE

## 2025-05-01 VITALS
HEIGHT: 61 IN | SYSTOLIC BLOOD PRESSURE: 110 MMHG | DIASTOLIC BLOOD PRESSURE: 60 MMHG | HEART RATE: 77 BPM | TEMPERATURE: 97.3 F | WEIGHT: 114 LBS | BODY MASS INDEX: 21.52 KG/M2 | OXYGEN SATURATION: 98 % | RESPIRATION RATE: 18 BRPM

## 2025-05-01 DIAGNOSIS — S09.90XA INJURY OF HEAD, INITIAL ENCOUNTER: Primary | ICD-10-CM

## 2025-05-01 PROCEDURE — 99215 OFFICE O/P EST HI 40 MIN: CPT

## 2025-05-01 PROCEDURE — 1123F ACP DISCUSS/DSCN MKR DOCD: CPT

## 2025-05-01 PROCEDURE — 1159F MED LIST DOCD IN RCRD: CPT

## 2025-05-01 ASSESSMENT — ENCOUNTER SYMPTOMS: SHORTNESS OF BREATH: 0

## 2025-05-01 NOTE — ASSESSMENT & PLAN NOTE
-Advised patient to go to the Emergency Room to rule out a head bleed. Discussed with her that at best she has a bruise but at worst she could have bleeding in her brain.   -Advised her that a severe headache getting worse, increase to abnormal gait, photosensitivity, and worsening vision are all time sensitive and need to be evaluated on an emergent basis.   -Patient would like to go to [facility] because it is closest to her house.   -I called the charge nurse at [facility] and gave report.

## 2025-05-01 NOTE — PROGRESS NOTES
The patient, Anita Sr, identity was verified by name and MRN.  Chief Complaint   Patient presents with    Headache     Vision is a little off- hit her head and pain in nose had eye surgery ~month ago.     OTHER     Had trouble sleeping, wants magnesium filled with us due to endo not wanting to do it anymore blood sugar going down     No LMP recorded.  /60   Pulse 77   Temp 97.3 °F (36.3 °C) (Temporal)   Resp 18   Ht 1.537 m (5' 0.5\")   Wt 51.7 kg (114 lb)   SpO2 98%   BMI 21.90 kg/m²       1/22/2025    11:46 AM   Amb Fall Risk Assessment and TUG Test   Do you feel unsteady or are you worried about falling?  yes    2 or more falls in past year? no    Fall with injury in past year? no        Proxy-reported     No data recorded  \"Have you been to the ER, urgent care clinic since your last visit?  Hospitalized since your last visit?\"    NO    “Have you seen or consulted any other health care providers outside our system since your last visit?”    NO             Social History     Substance and Sexual Activity   Sexual Activity Not Currently     Medication list reviewed and active medications noted. Patient is taking medications as directed.  See documentation in medication activity.  Allergies: allergy list reviewed, no new allergies added        No questionnaires available.                           Odilia Pope LPN      
IMPLANT  2012    CARPAL TUNNEL RELEASE  1984    CATARACT REMOVAL Bilateral 2012    CHOLECYSTECTOMY  1991    COLONOSCOPY  08/23/2017    COLONOSCOPY  03/25/2022    EYE SURGERY  2024    drainage of blood from left eye    HYSTERECTOMY (CERVIX STATUS UNKNOWN)  1994    KNEE ARTHROSCOPY Left 2003    MASTECTOMY Bilateral 2002    OTHER SURGICAL HISTORY  1962    repair eardrum structures     OTHER SURGICAL HISTORY      breast surgery 1999, 1984, 1962    MO UNLISTED PROCEDURE ABDOMEN PERITONEUM & OMENTUM  01/01/1995    growth removed    REFRACTIVE SURGERY  1995    torn retina    TONSILLECTOMY  1940          Current Outpatient Medications on File Prior to Visit   Medication Sig Dispense Refill    sertraline (ZOLOFT) 100 MG tablet take 1 tablet by mouth daily 90 tablet 3    piroxicam (FELDENE) 10 MG capsule Take 1 capsule by mouth daily      Magnesium 400 MG TABS 400 mg daily      L-METHYLFOLATE PO 1 tablet      Alpha-Lipoic Acid 600 MG TABS Take 1 tablet by mouth daily      amLODIPine-olmesartan (VONNIE) 5-40 MG per tablet Take 1 tablet by mouth daily      azelastine HCl 0.15 % SOLN 2 times daily      bisoprolol (ZEBETA) 5 MG tablet Take 1 tablet by mouth daily      clotrimazole-betamethasone (LOTRISONE) 1-0.05 % cream Apply topically 2 times daily      ergocalciferol (ERGOCALCIFEROL) 1.25 MG (77462 UT) capsule Take 1 capsule by mouth      Exenatide ER (BYDUREON BCISE) 2 MG/0.85ML injection Inject into the skin      gabapentin (NEURONTIN) 300 MG capsule Take 1 capsule by mouth nightly.      insulin glargine, 1 unit dial, (TOUJEO SOLOSTAR) 300 UNIT/ML concentrated injection pen Toujeo SoloStar U-300 Insulin      levothyroxine (SYNTHROID) 75 MCG tablet Take 1,000 tablets by mouth daily      melatonin 1 MG tablet Take 1.5 tablets by mouth      metFORMIN (GLUCOPHAGE) 500 MG tablet Take 1 tablet by mouth daily      polyethylene glycol (GLYCOLAX) 17 GM/SCOOP powder MIX 1 packet as directed and take 1 packet daily      Probiotic Product

## 2025-06-26 ENCOUNTER — TELEPHONE (OUTPATIENT)
Facility: CLINIC | Age: 85
End: 2025-06-26